# Patient Record
Sex: FEMALE | Race: BLACK OR AFRICAN AMERICAN | NOT HISPANIC OR LATINO | URBAN - METROPOLITAN AREA
[De-identification: names, ages, dates, MRNs, and addresses within clinical notes are randomized per-mention and may not be internally consistent; named-entity substitution may affect disease eponyms.]

---

## 2023-09-07 LAB
ABO GROUP (TYPE) IN BLOOD: NORMAL
ALANINE AMINOTRANSFERASE (SGPT) (U/L) IN SER/PLAS: 7 U/L (ref 7–45)
ALBUMIN (G/DL) IN SER/PLAS: 4.3 G/DL (ref 3.4–5)
ALKALINE PHOSPHATASE (U/L) IN SER/PLAS: 74 U/L (ref 33–110)
ANION GAP IN SER/PLAS: 16 MMOL/L (ref 10–20)
ANTIBODY SCREEN: NORMAL
ASPARTATE AMINOTRANSFERASE (SGOT) (U/L) IN SER/PLAS: 15 U/L (ref 9–39)
BILIRUBIN TOTAL (MG/DL) IN SER/PLAS: 0.3 MG/DL (ref 0–1.2)
CALCIUM (MG/DL) IN SER/PLAS: 9.7 MG/DL (ref 8.6–10.6)
CARBON DIOXIDE, TOTAL (MMOL/L) IN SER/PLAS: 20 MMOL/L (ref 21–32)
CHLAMYDIA TRACH., AMPLIFIED: NEGATIVE
CHLORIDE (MMOL/L) IN SER/PLAS: 102 MMOL/L (ref 98–107)
CREATININE (MG/DL) IN SER/PLAS: 0.51 MG/DL (ref 0.5–1.05)
CREATININE (MG/DL) IN URINE: 183 MG/DL (ref 20–320)
ERYTHROCYTE DISTRIBUTION WIDTH (RATIO) BY AUTOMATED COUNT: 13.4 % (ref 11.5–14.5)
ERYTHROCYTE MEAN CORPUSCULAR HEMOGLOBIN CONCENTRATION (G/DL) BY AUTOMATED: 32.3 G/DL (ref 32–36)
ERYTHROCYTE MEAN CORPUSCULAR VOLUME (FL) BY AUTOMATED COUNT: 91 FL (ref 80–100)
ERYTHROCYTES (10*6/UL) IN BLOOD BY AUTOMATED COUNT: 4.12 X10E12/L (ref 4–5.2)
GFR FEMALE: >90 ML/MIN/1.73M2
GLUCOSE (MG/DL) IN SER/PLAS: 76 MG/DL (ref 74–99)
HEMATOCRIT (%) IN BLOOD BY AUTOMATED COUNT: 37.5 % (ref 36–46)
HEMOGLOBIN (G/DL) IN BLOOD: 12.1 G/DL (ref 12–16)
HEPATITIS B VIRUS SURFACE AG PRESENCE IN SERUM: NONREACTIVE
HEPATITIS C VIRUS AB PRESENCE IN SERUM: NONREACTIVE
HIV 1/ 2 AG/AB SCREEN: NONREACTIVE
LEUKOCYTES (10*3/UL) IN BLOOD BY AUTOMATED COUNT: 9.4 X10E9/L (ref 4.4–11.3)
N. GONORRHEA, AMPLIFIED: NEGATIVE
NRBC (PER 100 WBCS) BY AUTOMATED COUNT: 0 /100 WBC (ref 0–0)
PLATELETS (10*3/UL) IN BLOOD AUTOMATED COUNT: 470 X10E9/L (ref 150–450)
POTASSIUM (MMOL/L) IN SER/PLAS: 4 MMOL/L (ref 3.5–5.3)
PROTEIN (MG/DL) IN URINE: 36 MG/DL (ref 5–24)
PROTEIN TOTAL: 7.5 G/DL (ref 6.4–8.2)
PROTEIN/CREATININE (MG/MG) IN URINE: 0.2 MG/MG CREAT (ref 0–0.17)
REFLEX ADDED, ANEMIA PANEL: ABNORMAL
RH FACTOR: NORMAL
SODIUM (MMOL/L) IN SER/PLAS: 134 MMOL/L (ref 136–145)
UREA NITROGEN (MG/DL) IN SER/PLAS: 8 MG/DL (ref 6–23)

## 2023-09-08 LAB
RUBELLA VIRUS IGG AB: NORMAL
SYPHILIS TOTAL AB: NONREACTIVE
URINE CULTURE: ABNORMAL

## 2023-09-10 LAB
HEMOGLOBIN A2: 3.1 %
HEMOGLOBIN A: 96.5 %
HEMOGLOBIN F: 0.4 %
HEMOGLOBIN IDENTIFICATION INTERPRETATION: NORMAL
PATH REVIEW-HGB IDENTIFICATION: NORMAL

## 2023-09-12 LAB — LAB MOLECULAR CA TECHNICAL NOTES: NORMAL

## 2023-10-03 PROBLEM — O03.9 SPONTANEOUS ABORTION (HHS-HCC): Status: ACTIVE | Noted: 2023-10-03

## 2023-10-03 PROBLEM — O36.5990: Status: ACTIVE | Noted: 2023-10-03

## 2023-10-03 PROBLEM — F32.A DEPRESSION AFFECTING PREGNANCY (HHS-HCC): Status: ACTIVE | Noted: 2023-10-03

## 2023-10-03 PROBLEM — O21.0 HYPEREMESIS OF PREGNANCY (HHS-HCC): Status: ACTIVE | Noted: 2023-10-03

## 2023-10-03 PROBLEM — F99 PSYCHIATRIC DISORDER: Status: ACTIVE | Noted: 2023-10-03

## 2023-10-03 PROBLEM — O23.40 UTI IN PREGNANCY (HHS-HCC): Status: ACTIVE | Noted: 2023-10-03

## 2023-10-03 PROBLEM — E66.9 CLASS 1 OBESITY: Status: ACTIVE | Noted: 2023-10-03

## 2023-10-03 PROBLEM — Z72.89 OTHER PROBLEMS RELATED TO LIFESTYLE: Status: ACTIVE | Noted: 2023-10-03

## 2023-10-03 PROBLEM — D64.9 ANEMIA: Status: ACTIVE | Noted: 2023-10-03

## 2023-10-03 PROBLEM — O09.33 INSUFFICIENT PRENATAL CARE IN THIRD TRIMESTER (HHS-HCC): Status: ACTIVE | Noted: 2023-10-03

## 2023-10-03 PROBLEM — Z87.59 HISTORY OF PRE-ECLAMPSIA: Status: ACTIVE | Noted: 2023-10-03

## 2023-10-03 PROBLEM — O99.340 DEPRESSION AFFECTING PREGNANCY (HHS-HCC): Status: ACTIVE | Noted: 2023-10-03

## 2023-10-03 PROBLEM — R30.0 DYSURIA: Status: ACTIVE | Noted: 2023-10-03

## 2023-10-03 PROBLEM — Z04.9 CONDITION NOT FOUND: Status: ACTIVE | Noted: 2023-10-03

## 2023-10-03 PROBLEM — Z87.51 HISTORY OF PRE-TERM LABOR: Status: ACTIVE | Noted: 2023-10-03

## 2023-10-03 RX ORDER — LABETALOL 200 MG/1
200 TABLET, FILM COATED ORAL 2 TIMES DAILY
COMMUNITY
Start: 2021-07-26 | End: 2024-02-08 | Stop reason: WASHOUT

## 2023-10-03 RX ORDER — METOCLOPRAMIDE 10 MG/1
10 TABLET ORAL EVERY 6 HOURS PRN
COMMUNITY
End: 2024-02-08 | Stop reason: SDUPTHER

## 2023-10-03 RX ORDER — MEDROXYPROGESTERONE ACETATE 150 MG/ML
1 INJECTION, SUSPENSION INTRAMUSCULAR ONCE
COMMUNITY
Start: 2021-07-26 | End: 2024-02-08

## 2023-10-03 RX ORDER — FLUOXETINE 20 MG/1
20 TABLET ORAL DAILY
COMMUNITY
End: 2024-02-08 | Stop reason: WASHOUT

## 2023-10-03 RX ORDER — ASPIRIN 81 MG/1
162 TABLET ORAL DAILY
COMMUNITY
End: 2024-02-08 | Stop reason: SDUPTHER

## 2023-10-03 RX ORDER — ONDANSETRON 4 MG/1
TABLET, FILM COATED ORAL
COMMUNITY
End: 2024-03-02 | Stop reason: HOSPADM

## 2023-10-04 ASSESSMENT — EDINBURGH POSTNATAL DEPRESSION SCALE (EPDS)
THE THOUGHT OF HARMING MYSELF HAS OCCURRED TO ME: NEVER
I HAVE BEEN SO UNHAPPY THAT I HAVE BEEN CRYING: ONLY OCCASIONALLY
I HAVE BEEN ABLE TO LAUGH AND SEE THE FUNNY SIDE OF THINGS: AS MUCH AS I ALWAYS COULD
I HAVE BLAMED MYSELF UNNECESSARILY WHEN THINGS WENT WRONG: YES, SOME OF THE TIME
I HAVE FELT SCARED OR PANICKY FOR NO GOOD REASON: NO, NOT AT ALL
I HAVE BEEN SO UNHAPPY THAT I HAVE HAD DIFFICULTY SLEEPING: NOT AT ALL
I HAVE BEEN ANXIOUS OR WORRIED FOR NO GOOD REASON: YES, SOMETIMES
I HAVE FELT SAD OR MISERABLE: NO, NOT AT ALL
THINGS HAVE BEEN GETTING ON TOP OF ME: NO, MOST OF THE TIME I HAVE COPED QUITE WELL
TOTAL SCORE: 6
I HAVE LOOKED FORWARD WITH ENJOYMENT TO THINGS: AS MUCH AS I EVER DID

## 2023-11-16 ENCOUNTER — ANCILLARY PROCEDURE (OUTPATIENT)
Dept: RADIOLOGY | Facility: CLINIC | Age: 23
End: 2023-11-16
Payer: MEDICAID

## 2023-11-16 DIAGNOSIS — O09.892 HISTORY OF PRETERM DELIVERY, CURRENTLY PREGNANT IN SECOND TRIMESTER (HHS-HCC): ICD-10-CM

## 2023-11-16 PROCEDURE — 76811 OB US DETAILED SNGL FETUS: CPT | Performed by: OBSTETRICS & GYNECOLOGY

## 2023-11-16 PROCEDURE — 76811 OB US DETAILED SNGL FETUS: CPT

## 2023-11-17 ENCOUNTER — TELEPHONE (OUTPATIENT)
Dept: OBSTETRICS AND GYNECOLOGY | Facility: CLINIC | Age: 23
End: 2023-11-17
Payer: MEDICAID

## 2023-11-17 NOTE — TELEPHONE ENCOUNTER
contacted pt  name and  verified  Spoke with pt about scheduling OB appt ASAP. We tried making appt though mychart no success. I wasn't able to navigate BOOK IT for [place] appts. Pt was transferred to scheduling and given phone number to call on Monday if no answer today [phone]. pt verbalized understanding  no further questions or concerns at this time

## 2023-11-17 NOTE — TELEPHONE ENCOUNTER
----- Message from JUDAH Almanzar-HARINI, JUDAH-CNP sent at 11/17/2023 12:28 PM EST -----  Please call patient and schedule for routine prenatal visit with first available provider. It looks like she has not been seen by provider since September.

## 2023-12-13 ENCOUNTER — ANCILLARY PROCEDURE (OUTPATIENT)
Dept: RADIOLOGY | Facility: CLINIC | Age: 23
End: 2023-12-13
Payer: MEDICAID

## 2023-12-13 DIAGNOSIS — O36.5920 MATERNAL CARE FOR OTHER KNOWN OR SUSPECTED POOR FETAL GROWTH, SECOND TRIMESTER, NOT APPLICABLE OR UNSPECIFIED (HHS-HCC): ICD-10-CM

## 2023-12-13 DIAGNOSIS — Z36.3 ENCOUNTER FOR ANTENATAL SCREENING FOR MALFORMATIONS (HHS-HCC): ICD-10-CM

## 2023-12-13 PROCEDURE — 76820 UMBILICAL ARTERY ECHO: CPT

## 2023-12-13 PROCEDURE — 76816 OB US FOLLOW-UP PER FETUS: CPT | Performed by: OBSTETRICS & GYNECOLOGY

## 2023-12-13 PROCEDURE — 76816 OB US FOLLOW-UP PER FETUS: CPT

## 2023-12-13 PROCEDURE — 76820 UMBILICAL ARTERY ECHO: CPT | Performed by: OBSTETRICS & GYNECOLOGY

## 2023-12-13 PROCEDURE — 76819 FETAL BIOPHYS PROFIL W/O NST: CPT | Performed by: OBSTETRICS & GYNECOLOGY

## 2023-12-20 ENCOUNTER — ROUTINE PRENATAL (OUTPATIENT)
Dept: OBSTETRICS AND GYNECOLOGY | Facility: CLINIC | Age: 23
End: 2023-12-20
Payer: MEDICAID

## 2023-12-20 ENCOUNTER — ANCILLARY PROCEDURE (OUTPATIENT)
Dept: RADIOLOGY | Facility: CLINIC | Age: 23
End: 2023-12-20
Payer: MEDICAID

## 2023-12-20 ENCOUNTER — TELEPHONE (OUTPATIENT)
Dept: PEDIATRICS | Facility: CLINIC | Age: 23
End: 2023-12-20

## 2023-12-20 ENCOUNTER — LAB (OUTPATIENT)
Dept: LAB | Facility: LAB | Age: 23
End: 2023-12-20
Payer: MEDICAID

## 2023-12-20 VITALS — SYSTOLIC BLOOD PRESSURE: 113 MMHG | WEIGHT: 135.1 LBS | BODY MASS INDEX: 27.31 KG/M2 | DIASTOLIC BLOOD PRESSURE: 71 MMHG

## 2023-12-20 DIAGNOSIS — O36.5990 PREGNANCY AFFECTED BY FETAL GROWTH RESTRICTION (HHS-HCC): Primary | ICD-10-CM

## 2023-12-20 DIAGNOSIS — O09.92 HIGH RISK PREGNANCY CASE MANAGEMENT PATIENT IN SECOND TRIMESTER (HHS-HCC): ICD-10-CM

## 2023-12-20 DIAGNOSIS — Z36.4 ULTRASOUND FOR ANTENATAL SCREENING FOR FETAL GROWTH RESTRICTION (HHS-HCC): ICD-10-CM

## 2023-12-20 DIAGNOSIS — F90.9 ATTENTION DEFICIT HYPERACTIVITY DISORDER (ADHD), UNSPECIFIED ADHD TYPE: ICD-10-CM

## 2023-12-20 DIAGNOSIS — Z36.3 ENCOUNTER FOR ANTENATAL SCREENING FOR MALFORMATIONS (HHS-HCC): ICD-10-CM

## 2023-12-20 LAB
ERYTHROCYTE [DISTWIDTH] IN BLOOD BY AUTOMATED COUNT: 13.6 % (ref 11.5–14.5)
FERRITIN SERPL-MCNC: 13 NG/ML
FOLATE SERPL-MCNC: 5.2 NG/ML
GLUCOSE 1H P 50 G GLC PO SERPL-MCNC: 102 MG/DL
HCT VFR BLD AUTO: 30 % (ref 36–46)
HGB BLD-MCNC: 9.8 G/DL (ref 12–16)
IRON SATN MFR SERPL: NORMAL %
IRON SERPL-MCNC: 36 UG/DL
MCH RBC QN AUTO: 30.2 PG (ref 26–34)
MCHC RBC AUTO-ENTMCNC: 32.7 G/DL (ref 32–36)
MCV RBC AUTO: 93 FL (ref 80–100)
NRBC BLD-RTO: 0 /100 WBCS (ref 0–0)
PLATELET # BLD AUTO: 356 X10*3/UL (ref 150–450)
RBC # BLD AUTO: 3.24 X10*6/UL (ref 4–5.2)
REFLEX ADDED, ANEMIA PANEL: NORMAL
T PALLIDUM AB SER QL: NONREACTIVE
TIBC SERPL-MCNC: NORMAL UG/DL
UIBC SERPL-MCNC: >450 UG/DL
VIT B12 SERPL-MCNC: 253 PG/ML
WBC # BLD AUTO: 9.2 X10*3/UL (ref 4.4–11.3)

## 2023-12-20 PROCEDURE — 76819 FETAL BIOPHYS PROFIL W/O NST: CPT | Performed by: OBSTETRICS & GYNECOLOGY

## 2023-12-20 PROCEDURE — 86780 TREPONEMA PALLIDUM: CPT

## 2023-12-20 PROCEDURE — 99214 OFFICE O/P EST MOD 30 MIN: CPT | Mod: GC,TH,25 | Performed by: STUDENT IN AN ORGANIZED HEALTH CARE EDUCATION/TRAINING PROGRAM

## 2023-12-20 PROCEDURE — 82746 ASSAY OF FOLIC ACID SERUM: CPT

## 2023-12-20 PROCEDURE — 85027 COMPLETE CBC AUTOMATED: CPT

## 2023-12-20 PROCEDURE — 76820 UMBILICAL ARTERY ECHO: CPT

## 2023-12-20 PROCEDURE — 36415 COLL VENOUS BLD VENIPUNCTURE: CPT

## 2023-12-20 PROCEDURE — 76820 UMBILICAL ARTERY ECHO: CPT | Performed by: OBSTETRICS & GYNECOLOGY

## 2023-12-20 PROCEDURE — 99214 OFFICE O/P EST MOD 30 MIN: CPT | Performed by: STUDENT IN AN ORGANIZED HEALTH CARE EDUCATION/TRAINING PROGRAM

## 2023-12-20 PROCEDURE — 87186 SC STD MICRODIL/AGAR DIL: CPT | Performed by: STUDENT IN AN ORGANIZED HEALTH CARE EDUCATION/TRAINING PROGRAM

## 2023-12-20 PROCEDURE — 90471 IMMUNIZATION ADMIN: CPT | Mod: GC | Performed by: STUDENT IN AN ORGANIZED HEALTH CARE EDUCATION/TRAINING PROGRAM

## 2023-12-20 PROCEDURE — 83550 IRON BINDING TEST: CPT

## 2023-12-20 PROCEDURE — 82728 ASSAY OF FERRITIN: CPT

## 2023-12-20 PROCEDURE — 82947 ASSAY GLUCOSE BLOOD QUANT: CPT

## 2023-12-20 PROCEDURE — 82607 VITAMIN B-12: CPT

## 2023-12-20 PROCEDURE — 76819 FETAL BIOPHYS PROFIL W/O NST: CPT

## 2023-12-20 NOTE — PROGRESS NOTES
Ob Follow-up  23     SUBJECTIVE      HPI: Maryjo Dykes is a 23 y.o.  at 27w3d here for RPNV.  She has no contractions, bleeding, or LOF. Reports normal fetal movement.       OBJECTIVE  Visit Vitals  /71   Wt 61.3 kg (135 lb 1.6 oz)   BMI 27.31 kg/m²   OB Status Pregnant   Smoking Status Never   BSA 1.6 m²            ASSESSMENT & PLAN    Maryjo Dykes is a 23 y.o.  at 27w3d here for the following concerns we addressed today:    Problem List Items Addressed This Visit          Pregnancy    Pregnancy affected by fetal growth restriction    Overview     Diagnosed on 23    Serial Growth US q 3 weeks  23 EFW 10%, AC 2%  23 805g 10%, AC 2%, FGR normal dopplers     BPP and UA Doppler weekly   Timing of delivery:   EFW 3-9%: 38 0/7 - 39 0/7  EFW 1-2%: 37wga           Current Assessment & Plan     Discussed pathogenesis of FGR and delivery timing based on dopplers and overall EFW, patient expressed understanding         High risk pregnancy case management patient in second trimester - Primary    Overview     [x] dating US  [x] PNLs reviewed wnl: rubella equivocal for MMR pp  [] Pap  [x] NT wnl   [] cell-free DNA  [x] Anatomy US  [] 1 hour OGTT: collected   [] 2nd trimester labs: collected   [x] Tdap: done   [x] flu/covid - declines , will think about it  [] GBS  [] Delivery Planning  [x] PPBC - tubal, consent signed            Current Assessment & Plan     Connected with [facility] today         Relevant Orders    Urine culture    Glucose, 1 Hour Screen, Pregnancy    CBC Anemia Panel With Reflex, Pregnancy    Syphilis Screen with Reflex    Follow Up In Obstetrics    ADHD    Overview     PRN aderrall use              Orders Placed This Encounter   Procedures    Urine culture     Order Specific Question:   Release result to [facility]     Answer:   Immediate [1]    Tdap vaccine, age 7 years and older  (BOOSTRIX)    Glucose, 1 Hour Screen, Pregnancy      Standing Status:   Future     Number of Occurrences:   1     Standing Expiration Date:   12/20/2024     Order Specific Question:   Release result to MyChart     Answer:   Immediate [1]    CBC Anemia Panel With Reflex, Pregnancy     Standing Status:   Future     Number of Occurrences:   1     Standing Expiration Date:   12/20/2024     Order Specific Question:   Release result to MyChart     Answer:   Immediate [1]    Syphilis Screen with Reflex     Standing Status:   Future     Number of Occurrences:   1     Standing Expiration Date:   12/20/2024     Order Specific Question:   Release result to MyChart     Answer:   Immediate [1]        RTC in 2 weeks  Seen and discussed with Dr. Pio Ariza MD

## 2023-12-22 DIAGNOSIS — O23.42 URINARY TRACT INFECTION IN MOTHER DURING SECOND TRIMESTER OF PREGNANCY (HHS-HCC): ICD-10-CM

## 2023-12-22 DIAGNOSIS — E61.1 IRON DEFICIENCY: Primary | ICD-10-CM

## 2023-12-22 LAB
BACTERIA UR CULT: ABNORMAL
BACTERIA UR CULT: ABNORMAL

## 2023-12-22 RX ORDER — FERROUS SULFATE 325(65) MG
325 TABLET, DELAYED RELEASE (ENTERIC COATED) ORAL 3 TIMES WEEKLY
Qty: 90 TABLET | Refills: 11 | Status: SHIPPED | OUTPATIENT
Start: 2023-12-22 | End: 2024-02-08 | Stop reason: SDUPTHER

## 2023-12-22 RX ORDER — NITROFURANTOIN 25; 75 MG/1; MG/1
100 CAPSULE ORAL 2 TIMES DAILY
Qty: 14 CAPSULE | Refills: 0 | Status: SHIPPED | OUTPATIENT
Start: 2023-12-22 | End: 2023-12-29

## 2023-12-28 ENCOUNTER — TELEPHONE (OUTPATIENT)
Dept: PEDIATRICS | Facility: CLINIC | Age: 23
End: 2023-12-28
Payer: MEDICAID

## 2023-12-28 NOTE — TELEPHONE ENCOUNTER
SW received referral from [facility] that pt was interested in a call from [facility]. SW left a voicemail message with identifying information for pt and requested a call back. SW second attempt to reach pt. SW will remain available to assist if pt calls back.    Addendum: Pt called SW back. Pt endorsed interest in referral for home-based counseling for anxiety. Denied SI/HI or safety concerns. SW discussed options for counseling referral and obtained verbal consent to refer pt to [facility]. No further SW needs at this time. SW contact info provided if needs arise.

## 2024-01-03 ENCOUNTER — ANCILLARY PROCEDURE (OUTPATIENT)
Dept: RADIOLOGY | Facility: CLINIC | Age: 24
End: 2024-01-03
Payer: MEDICAID

## 2024-01-03 DIAGNOSIS — Z03.74 ENCOUNTER FOR SUSPECTED PROBLEM WITH FETAL GROWTH RULED OUT: ICD-10-CM

## 2024-01-03 PROCEDURE — 76816 OB US FOLLOW-UP PER FETUS: CPT

## 2024-01-03 PROCEDURE — 76816 OB US FOLLOW-UP PER FETUS: CPT | Performed by: OBSTETRICS & GYNECOLOGY

## 2024-01-03 PROCEDURE — 76819 FETAL BIOPHYS PROFIL W/O NST: CPT

## 2024-01-03 PROCEDURE — 76820 UMBILICAL ARTERY ECHO: CPT

## 2024-01-03 PROCEDURE — 76820 UMBILICAL ARTERY ECHO: CPT | Performed by: OBSTETRICS & GYNECOLOGY

## 2024-01-03 PROCEDURE — 76819 FETAL BIOPHYS PROFIL W/O NST: CPT | Performed by: OBSTETRICS & GYNECOLOGY

## 2024-01-10 ENCOUNTER — ANCILLARY PROCEDURE (OUTPATIENT)
Dept: RADIOLOGY | Facility: CLINIC | Age: 24
End: 2024-01-10
Payer: MEDICAID

## 2024-01-10 DIAGNOSIS — Z03.74 ENCOUNTER FOR SUSPECTED PROBLEM WITH FETAL GROWTH RULED OUT: ICD-10-CM

## 2024-01-10 PROCEDURE — 76819 FETAL BIOPHYS PROFIL W/O NST: CPT

## 2024-01-10 PROCEDURE — 76820 UMBILICAL ARTERY ECHO: CPT | Performed by: STUDENT IN AN ORGANIZED HEALTH CARE EDUCATION/TRAINING PROGRAM

## 2024-01-10 PROCEDURE — 76820 UMBILICAL ARTERY ECHO: CPT

## 2024-01-10 PROCEDURE — 76819 FETAL BIOPHYS PROFIL W/O NST: CPT | Performed by: STUDENT IN AN ORGANIZED HEALTH CARE EDUCATION/TRAINING PROGRAM

## 2024-01-17 ENCOUNTER — APPOINTMENT (OUTPATIENT)
Dept: RADIOLOGY | Facility: CLINIC | Age: 24
End: 2024-01-17
Payer: MEDICAID

## 2024-01-20 NOTE — PROGRESS NOTES
I saw and evaluated the patient. I personally obtained the key and critical portions of the history and physical exam or was physically present for key and critical portions performed by the resident/fellow. I reviewed the resident/fellow's documentation and discussed the patient with the resident/fellow. I agree with the resident/fellow's medical decision making as documented in the note.    Molly Suero MD

## 2024-01-24 ENCOUNTER — HOSPITAL ENCOUNTER (OUTPATIENT)
Dept: RADIOLOGY | Facility: CLINIC | Age: 24
Discharge: HOME | End: 2024-01-24
Payer: MEDICAID

## 2024-01-24 DIAGNOSIS — Z03.74 ENCOUNTER FOR SUSPECTED PROBLEM WITH FETAL GROWTH RULED OUT: ICD-10-CM

## 2024-01-24 PROCEDURE — 76816 OB US FOLLOW-UP PER FETUS: CPT | Performed by: OBSTETRICS & GYNECOLOGY

## 2024-01-24 PROCEDURE — 76819 FETAL BIOPHYS PROFIL W/O NST: CPT

## 2024-01-24 PROCEDURE — 76819 FETAL BIOPHYS PROFIL W/O NST: CPT | Performed by: OBSTETRICS & GYNECOLOGY

## 2024-01-24 PROCEDURE — 76816 OB US FOLLOW-UP PER FETUS: CPT

## 2024-01-24 PROCEDURE — 76820 UMBILICAL ARTERY ECHO: CPT | Performed by: OBSTETRICS & GYNECOLOGY

## 2024-01-31 ENCOUNTER — HOSPITAL ENCOUNTER (OUTPATIENT)
Dept: RADIOLOGY | Facility: CLINIC | Age: 24
Discharge: HOME | End: 2024-01-31
Payer: MEDICAID

## 2024-01-31 DIAGNOSIS — O36.5990 FETAL GROWTH RESTRICTION ANTEPARTUM (HHS-HCC): ICD-10-CM

## 2024-01-31 DIAGNOSIS — Z03.74 ENCOUNTER FOR SUSPECTED PROBLEM WITH FETAL GROWTH RULED OUT: ICD-10-CM

## 2024-01-31 PROCEDURE — 76820 UMBILICAL ARTERY ECHO: CPT | Performed by: STUDENT IN AN ORGANIZED HEALTH CARE EDUCATION/TRAINING PROGRAM

## 2024-01-31 PROCEDURE — 76820 UMBILICAL ARTERY ECHO: CPT

## 2024-01-31 PROCEDURE — 76819 FETAL BIOPHYS PROFIL W/O NST: CPT

## 2024-01-31 PROCEDURE — 76819 FETAL BIOPHYS PROFIL W/O NST: CPT | Performed by: STUDENT IN AN ORGANIZED HEALTH CARE EDUCATION/TRAINING PROGRAM

## 2024-02-07 ENCOUNTER — HOSPITAL ENCOUNTER (OUTPATIENT)
Dept: RADIOLOGY | Facility: CLINIC | Age: 24
Discharge: HOME | End: 2024-02-07
Payer: MEDICAID

## 2024-02-07 DIAGNOSIS — Z03.74 ENCOUNTER FOR SUSPECTED PROBLEM WITH FETAL GROWTH RULED OUT: ICD-10-CM

## 2024-02-07 DIAGNOSIS — O36.5990 IUGR (INTRAUTERINE GROWTH RESTRICTION) AFFECTING CARE OF MOTHER (HHS-HCC): ICD-10-CM

## 2024-02-07 PROCEDURE — 76819 FETAL BIOPHYS PROFIL W/O NST: CPT | Performed by: OBSTETRICS & GYNECOLOGY

## 2024-02-07 PROCEDURE — 76820 UMBILICAL ARTERY ECHO: CPT

## 2024-02-07 PROCEDURE — 76820 UMBILICAL ARTERY ECHO: CPT | Performed by: OBSTETRICS & GYNECOLOGY

## 2024-02-07 PROCEDURE — 76819 FETAL BIOPHYS PROFIL W/O NST: CPT

## 2024-02-08 ENCOUNTER — ROUTINE PRENATAL (OUTPATIENT)
Dept: OBSTETRICS AND GYNECOLOGY | Facility: CLINIC | Age: 24
End: 2024-02-08
Payer: MEDICAID

## 2024-02-08 ENCOUNTER — LAB (OUTPATIENT)
Dept: LAB | Facility: LAB | Age: 24
End: 2024-02-08
Payer: MEDICAID

## 2024-02-08 VITALS — DIASTOLIC BLOOD PRESSURE: 76 MMHG | SYSTOLIC BLOOD PRESSURE: 115 MMHG | BODY MASS INDEX: 27.81 KG/M2 | WEIGHT: 137.6 LBS

## 2024-02-08 DIAGNOSIS — O99.013 ANEMIA AFFECTING PREGNANCY IN THIRD TRIMESTER (HHS-HCC): ICD-10-CM

## 2024-02-08 DIAGNOSIS — O21.9 NAUSEA AND VOMITING DURING PREGNANCY (HHS-HCC): ICD-10-CM

## 2024-02-08 DIAGNOSIS — O36.5990 PREGNANCY AFFECTED BY FETAL GROWTH RESTRICTION (HHS-HCC): ICD-10-CM

## 2024-02-08 DIAGNOSIS — F90.9 ATTENTION DEFICIT HYPERACTIVITY DISORDER (ADHD), UNSPECIFIED ADHD TYPE: ICD-10-CM

## 2024-02-08 DIAGNOSIS — O09.93 HIGH-RISK PREGNANCY IN THIRD TRIMESTER (HHS-HCC): Primary | ICD-10-CM

## 2024-02-08 DIAGNOSIS — E61.1 IRON DEFICIENCY: ICD-10-CM

## 2024-02-08 LAB
FERRITIN SERPL-MCNC: 10 NG/ML (ref 8–150)
HGB RETIC QN: 28 PG (ref 28–38)
IMMATURE RETIC FRACTION: 24.1 %
POC APPEARANCE, URINE: CLEAR
POC BILIRUBIN, URINE: NEGATIVE
POC BLOOD, URINE: NEGATIVE
POC COLOR, URINE: YELLOW
POC GLUCOSE, URINE: NEGATIVE MG/DL
POC KETONES, URINE: NEGATIVE MG/DL
POC LEUKOCYTES, URINE: ABNORMAL
POC NITRITE,URINE: POSITIVE
POC PH, URINE: 7.5 PH
POC PROTEIN, URINE: ABNORMAL MG/DL
POC SPECIFIC GRAVITY, URINE: 1.01
POC UROBILINOGEN, URINE: 1 EU/DL
RETICS #: 0.06 X10*6/UL (ref 0.02–0.08)
RETICS/RBC NFR AUTO: 2 % (ref 0.5–2)

## 2024-02-08 PROCEDURE — 99214 OFFICE O/P EST MOD 30 MIN: CPT | Performed by: ADVANCED PRACTICE MIDWIFE

## 2024-02-08 PROCEDURE — 87086 URINE CULTURE/COLONY COUNT: CPT | Performed by: ADVANCED PRACTICE MIDWIFE

## 2024-02-08 PROCEDURE — 82728 ASSAY OF FERRITIN: CPT

## 2024-02-08 PROCEDURE — 99214 OFFICE O/P EST MOD 30 MIN: CPT | Mod: TH | Performed by: ADVANCED PRACTICE MIDWIFE

## 2024-02-08 PROCEDURE — 85045 AUTOMATED RETICULOCYTE COUNT: CPT

## 2024-02-08 PROCEDURE — 36415 COLL VENOUS BLD VENIPUNCTURE: CPT

## 2024-02-08 PROCEDURE — 81003 URINALYSIS AUTO W/O SCOPE: CPT | Mod: QW,91 | Performed by: ADVANCED PRACTICE MIDWIFE

## 2024-02-08 RX ORDER — METOCLOPRAMIDE 10 MG/1
10 TABLET ORAL EVERY 6 HOURS PRN
Qty: 60 TABLET | Refills: 2 | Status: SHIPPED | OUTPATIENT
Start: 2024-02-08 | End: 2024-03-02 | Stop reason: HOSPADM

## 2024-02-08 RX ORDER — IRON,CARBONYL 15 MG
1 TABLET,CHEWABLE ORAL DAILY
Qty: 30 EACH | Refills: 2 | Status: SHIPPED | OUTPATIENT
Start: 2024-02-08 | End: 2024-02-15 | Stop reason: SDUPTHER

## 2024-02-08 RX ORDER — ONDANSETRON 4 MG/1
4 TABLET, ORALLY DISINTEGRATING ORAL EVERY 8 HOURS PRN
Qty: 60 TABLET | Refills: 1 | Status: SHIPPED | OUTPATIENT
Start: 2024-02-08 | End: 2024-03-02 | Stop reason: HOSPADM

## 2024-02-08 RX ORDER — ASPIRIN 81 MG/1
162 TABLET ORAL DAILY
Qty: 60 TABLET | Refills: 1 | Status: SHIPPED | OUTPATIENT
Start: 2024-02-08 | End: 2024-03-02 | Stop reason: HOSPADM

## 2024-02-08 ASSESSMENT — ENCOUNTER SYMPTOMS
ENDOCRINE NEGATIVE: 0
RESPIRATORY NEGATIVE: 0
HEMATOLOGIC/LYMPHATIC NEGATIVE: 0
CONSTITUTIONAL NEGATIVE: 0
ALLERGIC/IMMUNOLOGIC NEGATIVE: 0
GASTROINTESTINAL NEGATIVE: 0
NEUROLOGICAL NEGATIVE: 0
EYES NEGATIVE: 0
PSYCHIATRIC NEGATIVE: 0
CARDIOVASCULAR NEGATIVE: 0
MUSCULOSKELETAL NEGATIVE: 0

## 2024-02-08 NOTE — PROGRESS NOTES
Subjective     Maryjo Dykes is a 23 y.o.  at 34w4d with a working estimated date of delivery of 3/17/2024, by Ultrasound who presents for a routine prenatal visit.     She denies vaginal bleeding, leakage of fluid, decreased fetal movements, or contractions.    Patient has not been seen since  - has been getting weekly BPPs for FGR. Patient c/o nausea and vomiting   Vomiting 4 times per day, sometimes keeps apple juice down. Unable to keep water/food down. Not currently taking any medications. Stays in bed and sleeps. Reports zofran and reglan did help but she ran out of medication   Completed antibiotics for UTI - will send MURRAY today  Signed tubal consent at last visit - reports she has copy of paper at home     Objective   Physical Exam:   Weight: 62.4 kg (137 lb 9.6 oz)  Expected Total Weight Gain: Could not be calculated   Pregravid BMI: Could not be calculated  BP: 115/76 (Pluse-87)  Fetal Heart Rate: 140 Fundal Height (cm): 34 cm Presentation: Vertex           Postpartum Depression: Medium Risk (10/4/2023)    [place]  Depression Scale     Last EPDS Total Score: 6     Last EPDS Self Harm Result: Never        Problem List Items Addressed This Visit       Pregnancy affected by fetal growth restriction    Overview     Diagnosed on 23    Serial Growth US q 3 weeks  23 EFW 10%, AC 2%  23 805g 10%, AC 2%, FGR normal dopplers     BPP and UA Doppler weekly   Timing of delivery:   EFW 3-9%: 38 0/7 - 39 0/7  EFW 1-2%: 37wga           Relevant Medications    aspirin 81 mg EC tablet    Other Relevant Orders    Urine Culture    High risk pregnancy case management patient in second trimester - Primary    Overview     [x] dating US  [x] PNLs reviewed wnl: rubella equivocal for MMR pp  [] Pap  [x] NT wnl   [] cell-free DNA  [x] Anatomy US  [x] 1 hour OGTT: collected   [x] 2nd trimester labs: collected   [x] Tdap: done   [x] flu/covid - declines , will think about  it  [] GBS  [] Delivery Planning  [x] PPBC - tubal, consent signed            ADHD    Overview     PRN aderrall use         Anemia affecting pregnancy in third trimester    Relevant Orders    CBC Anemia Panel With Reflex,Pregnancy    Ferritin    Reticulocytes    Nausea and vomiting during pregnancy    Relevant Medications    ondansetron ODT (Zofran-ODT) 4 mg disintegrating tablet    metoclopramide (Reglan) 10 mg tablet     Other Visit Diagnoses       Iron deficiency        Relevant Medications    iron, carbonyl (Iron Chews) 15 mg tablet,chewable            Postpartum contraception options discussed, patient desires tubal - consent signed at last visit, patient has copy   surveillance weekly for FGR  UTI - completed antibiotics - will send MURRAY today  Anemia - took iron pills but states she needs a refill - rx sent, recheck CBC/ferritin retic today   Refill on zofran and reglan. UA - negative ketones, SG 1.015, discussed when to present to triage if unable to keep down fluids. Discussed importance of hydration and small frequent snacks. Referral to [facility] for Boost - order form filled out and given to patient to stop down at [facility] office  Discussed importance of regular prenatal visits in addition to ultrasound   Reviewed s/sx of PTL, warning signs, fetal movement counts, and when to call provider  Follow up in 1 week for a routine prenatal visit.  next visit: Tdap, GBS     Khushbu Cooley, JUDAH-HARINI, JUDAH-CNP

## 2024-02-09 LAB — BACTERIA UR CULT: ABNORMAL

## 2024-02-11 ENCOUNTER — HOSPITAL ENCOUNTER (OUTPATIENT)
Facility: HOSPITAL | Age: 24
Discharge: HOME | End: 2024-02-12
Attending: OBSTETRICS & GYNECOLOGY | Admitting: OBSTETRICS & GYNECOLOGY
Payer: MEDICAID

## 2024-02-11 PROCEDURE — 99214 OFFICE O/P EST MOD 30 MIN: CPT

## 2024-02-11 PROCEDURE — 2500000001 HC RX 250 WO HCPCS SELF ADMINISTERED DRUGS (ALT 637 FOR MEDICARE OP)

## 2024-02-11 PROCEDURE — 59025 FETAL NON-STRESS TEST: CPT | Mod: GC

## 2024-02-11 PROCEDURE — 59025 FETAL NON-STRESS TEST: CPT

## 2024-02-11 PROCEDURE — 2500000004 HC RX 250 GENERAL PHARMACY W/ HCPCS (ALT 636 FOR OP/ED)

## 2024-02-11 RX ORDER — ONDANSETRON HYDROCHLORIDE 2 MG/ML
4 INJECTION, SOLUTION INTRAVENOUS EVERY 6 HOURS PRN
Status: DISCONTINUED | OUTPATIENT
Start: 2024-02-11 | End: 2024-02-12 | Stop reason: HOSPADM

## 2024-02-11 RX ORDER — ONDANSETRON 4 MG/1
4 TABLET, FILM COATED ORAL EVERY 6 HOURS PRN
Status: DISCONTINUED | OUTPATIENT
Start: 2024-02-11 | End: 2024-02-12 | Stop reason: HOSPADM

## 2024-02-11 RX ORDER — ACETAMINOPHEN 325 MG/1
975 TABLET ORAL ONCE
Status: DISCONTINUED | OUTPATIENT
Start: 2024-02-11 | End: 2024-02-12 | Stop reason: HOSPADM

## 2024-02-11 RX ORDER — CYCLOBENZAPRINE HCL 10 MG
10 TABLET ORAL ONCE
Status: COMPLETED | OUTPATIENT
Start: 2024-02-11 | End: 2024-02-11

## 2024-02-11 RX ADMIN — CYCLOBENZAPRINE 10 MG: 10 TABLET, FILM COATED ORAL at 21:36

## 2024-02-11 RX ADMIN — ONDANSETRON 4 MG: 2 INJECTION INTRAMUSCULAR; INTRAVENOUS at 22:15

## 2024-02-11 SDOH — SOCIAL STABILITY: SOCIAL INSECURITY: ARE YOU OR HAVE YOU BEEN THREATENED OR ABUSED PHYSICALLY, EMOTIONALLY, OR SEXUALLY BY ANYONE?: NO

## 2024-02-11 SDOH — SOCIAL STABILITY: SOCIAL INSECURITY: DOES ANYONE TRY TO KEEP YOU FROM HAVING/CONTACTING OTHER FRIENDS OR DOING THINGS OUTSIDE YOUR HOME?: NO

## 2024-02-11 SDOH — SOCIAL STABILITY: SOCIAL INSECURITY: HAVE YOU HAD THOUGHTS OF HARMING ANYONE ELSE?: NO

## 2024-02-11 SDOH — ECONOMIC STABILITY: HOUSING INSECURITY: DO YOU FEEL UNSAFE GOING BACK TO THE PLACE WHERE YOU ARE LIVING?: NO

## 2024-02-11 SDOH — HEALTH STABILITY: MENTAL HEALTH: SUICIDAL BEHAVIOR (LIFETIME): NO

## 2024-02-11 SDOH — HEALTH STABILITY: MENTAL HEALTH: NON-SPECIFIC ACTIVE SUICIDAL THOUGHTS (PAST 1 MONTH): NO

## 2024-02-11 SDOH — SOCIAL STABILITY: SOCIAL INSECURITY: HAS ANYONE EVER THREATENED TO HURT YOUR FAMILY OR YOUR PETS?: NO

## 2024-02-11 SDOH — HEALTH STABILITY: MENTAL HEALTH: WISH TO BE DEAD (PAST 1 MONTH): NO

## 2024-02-11 SDOH — HEALTH STABILITY: MENTAL HEALTH: HAVE YOU USED ANY SUBSTANCES (CANABIS, COCAINE, HEROIN, HALLUCINOGENS, INHALANTS, ETC.) IN THE PAST 12 MONTHS?: NO

## 2024-02-11 SDOH — SOCIAL STABILITY: SOCIAL INSECURITY: ABUSE SCREEN: ADULT

## 2024-02-11 SDOH — SOCIAL STABILITY: SOCIAL INSECURITY: VERBAL ABUSE: DENIES

## 2024-02-11 SDOH — SOCIAL STABILITY: SOCIAL INSECURITY: ARE THERE ANY APPARENT SIGNS OF INJURIES/BEHAVIORS THAT COULD BE RELATED TO ABUSE/NEGLECT?: NO

## 2024-02-11 SDOH — SOCIAL STABILITY: SOCIAL INSECURITY: DO YOU FEEL ANYONE HAS EXPLOITED OR TAKEN ADVANTAGE OF YOU FINANCIALLY OR OF YOUR PERSONAL PROPERTY?: NO

## 2024-02-11 SDOH — SOCIAL STABILITY: SOCIAL INSECURITY: PHYSICAL ABUSE: DENIES

## 2024-02-11 SDOH — HEALTH STABILITY: MENTAL HEALTH: WERE YOU ABLE TO COMPLETE ALL THE BEHAVIORAL HEALTH SCREENINGS?: YES

## 2024-02-11 SDOH — HEALTH STABILITY: MENTAL HEALTH: HAVE YOU USED ANY PRESCRIPTION DRUGS OTHER THAN PRESCRIBED IN THE PAST 12 MONTHS?: NO

## 2024-02-11 ASSESSMENT — LIFESTYLE VARIABLES
HOW MANY STANDARD DRINKS CONTAINING ALCOHOL DO YOU HAVE ON A TYPICAL DAY: PATIENT DOES NOT DRINK
AUDIT-C TOTAL SCORE: 0
SKIP TO QUESTIONS 9-10: 1
HOW OFTEN DO YOU HAVE 6 OR MORE DRINKS ON ONE OCCASION: NEVER
HOW OFTEN DO YOU HAVE A DRINK CONTAINING ALCOHOL: NEVER
AUDIT-C TOTAL SCORE: 0

## 2024-02-11 ASSESSMENT — PATIENT HEALTH QUESTIONNAIRE - PHQ9
SUM OF ALL RESPONSES TO PHQ9 QUESTIONS 1 & 2: 0
2. FEELING DOWN, DEPRESSED OR HOPELESS: NOT AT ALL
1. LITTLE INTEREST OR PLEASURE IN DOING THINGS: NOT AT ALL

## 2024-02-12 ENCOUNTER — TELEPHONE (OUTPATIENT)
Dept: OBSTETRICS AND GYNECOLOGY | Facility: HOSPITAL | Age: 24
End: 2024-02-12
Payer: MEDICAID

## 2024-02-12 VITALS — DIASTOLIC BLOOD PRESSURE: 66 MMHG | HEART RATE: 69 BPM | OXYGEN SATURATION: 98 % | SYSTOLIC BLOOD PRESSURE: 115 MMHG

## 2024-02-12 DIAGNOSIS — O23.43 URINARY TRACT INFECTION IN MOTHER DURING THIRD TRIMESTER OF PREGNANCY (HHS-HCC): Primary | ICD-10-CM

## 2024-02-12 DIAGNOSIS — E61.1 IRON DEFICIENCY: ICD-10-CM

## 2024-02-12 DIAGNOSIS — B96.89 BACTERIAL VAGINOSIS IN PREGNANCY (HHS-HCC): ICD-10-CM

## 2024-02-12 DIAGNOSIS — O23.599 BACTERIAL VAGINOSIS IN PREGNANCY (HHS-HCC): ICD-10-CM

## 2024-02-12 DIAGNOSIS — Z3A.35 35 WEEKS GESTATION OF PREGNANCY (HHS-HCC): Primary | ICD-10-CM

## 2024-02-12 LAB
CLUE CELLS SPEC QL WET PREP: PRESENT
T VAGINALIS SPEC QL WET PREP: ABNORMAL
WBC VAG QL WET PREP: ABNORMAL
YEAST VAG QL WET PREP: ABNORMAL

## 2024-02-12 PROCEDURE — 99215 OFFICE O/P EST HI 40 MIN: CPT | Mod: 25

## 2024-02-12 PROCEDURE — 87210 SMEAR WET MOUNT SALINE/INK: CPT

## 2024-02-12 RX ORDER — METRONIDAZOLE 500 MG/1
500 TABLET ORAL 2 TIMES DAILY
Qty: 14 TABLET | Refills: 0 | Status: SHIPPED | OUTPATIENT
Start: 2024-02-12 | End: 2024-02-14 | Stop reason: SDUPTHER

## 2024-02-12 RX ORDER — AMOXICILLIN 500 MG/1
500 CAPSULE ORAL EVERY 8 HOURS SCHEDULED
Qty: 15 CAPSULE | Refills: 0 | Status: SHIPPED | OUTPATIENT
Start: 2024-02-12 | End: 2024-02-14 | Stop reason: SDUPTHER

## 2024-02-12 NOTE — ED TRIAGE NOTES
PT to the ED for contractions 1 min apart, . Resident checked pt, 1cm dialated. Water broke unknown time.

## 2024-02-12 NOTE — H&P
Obstetrical Triage History and Physical     Maryjo Dykes is a 23 y.o.  at 35w0d     Chief Complaint: Pregnancy Problem and Contractions    Assessment/Plan    R/o PPROM/PTL  - Neg x 3 for ROM  - Cervix: 3  - Tylenol/flexeril for pain  - Plan for 2hr recheck    IUP at 35w0d   - NST reactive  - Good fetal movement  - GBS +, for PCN if in PTL    Maternal Well-being  - Vital signs stable and WNL  - All questions and concerns addressed     Plan signed out to Dr. Botello.  For recheck in 2hrs.  Discussed repeating SSE if noting continued leakage.    JUDAH Spencer-CNP     Updated A/P  - 2 hrs later: re-check 1/50/-3 (essentially unchanged given new examiner). Pt reports ctx have spaced and she has been able to sleep comfortably.  - Re-spec was neg x2 for pooling, ferning, and nitrazine. Several white cystic lesions seen on cervix, likely nabothian cysts. Pt declined HSV swab despite counseling on risks of HSV in pregnancy. Wet prep collected, and GC/CT ordered off urine. Will contact pt if abnormal.  - JARED 15, cephalic presentation  - reactive NST while in triage  - FGR: receiving weekly dopplers, last  wnl. Has next US  and next appt  to follow up outpatient.  - Given neg speculum exam x2 and normal JARED, PPROM unlikely. Given unchanged cervix and spacing ctx, PTL unlikely. Given return precautions. Stable for discharge home.    Pt seen and d/w Dr. Mary Alice Botello MD PGY-2        Active Problems:  There are no active Hospital Problems.      Pregnancy Problems (from 23 to present)       Problem Noted Resolved    Anemia affecting pregnancy in third trimester 2024 by ERICK Campa, APRN-CNP No    Priority:  Medium      Nausea and vomiting during pregnancy 2024 by ERICK Campa, APRN-CNP No    Priority:  Medium      High risk pregnancy case management patient in second trimester 2023 by lEijah Ariza MD No    Priority:  Medium      Overview Addendum  2024 11:48 AM by Khushbu Cooley, APRN-CNM, APRN-CNP     [x] dating US  [x] PNLs reviewed wnl: rubella equivocal for MMR pp  [] Pap  [x] NT wnl   [] cell-free DNA  [x] Anatomy US  [x] 1 hour OGTT: collected   [x] 2nd trimester labs: collected   [x] Tdap: done   [x] flu/covid - declines , will think about it  [] GBS  [] Delivery Planning  [x] PPBC - tubal, consent signed            ADHD 2023 by Elijah Ariza MD No    Priority:  Medium      Overview Addendum 2023 12:12 PM by Elijah Ariza MD     PRN aderrall use         Pregnancy affected by fetal growth restriction 10/3/2023 by Ani Sparks No    Priority:  Medium      Overview Addendum 2023 11:35 AM by Elijah Ariza MD     Diagnosed on 23    Serial Growth US q 3 weeks  23 EFW 10%, AC 2%  23 805g 10%, AC 2%, FGR normal dopplers     BPP and UA Doppler weekly   Timing of delivery:   EFW 3-9%: 38 0/7 - 39 0/7  EFW 1-2%: 37wga                 Subjective   Amyia is here for contractions and leakage of fluid.  Noted leakage at 8:30.  Denies continued leaking.  Reports contractions started after water breaking.  Irregular, noting them every few minutes.  Denies VB and endorses good fetal movement.     Obstetrical History   OB History    Para Term  AB Living   6 4 1 3 1 4   SAB IAB Ectopic Multiple Live Births   1 0 0 0 4      # Outcome Date GA Lbr Campbell/2nd Weight Sex Delivery Anes PTL Lv   6 Current            5  10/22/22 36w0d  2.126 kg F Vag-Spont  Y SUSANNAH   4  21 36w6d    Vag-Spont  Y SUSANNAH   3  20 34w0d  2.07 kg F Vag-Spont None Y SUSANNAH   2 Term 18 37w6d  1.984 kg M Vag-Spont EPI N SUSANNAH   1 SAB 2016              Obstetric Comments   2023 4:58 PM - MR  Pt no-showed appointment today for MFM consult with Dr. Bar March        Past Medical History  Past Medical History:   Diagnosis Date    Depression     History of pre-eclampsia     History of   delivery, currently pregnant     Hyperemesis affecting pregnancy, antepartum     Pregnancy     Psychiatric problem     ADHD, BIPOLAR, SCHIZOPHRENIA    UTI in pregnancy, antepartum         Past Surgical History   No past surgical history on file.    Social History  Social History     Tobacco Use    Smoking status: Never    Smokeless tobacco: Never   Substance Use Topics    Alcohol use: Never     Substance and Sexual Activity   Drug Use Never       Allergies  Milk     Medications  Medications Prior to Admission   Medication Sig Dispense Refill Last Dose    aspirin 81 mg EC tablet Take 2 tablets (162 mg) by mouth once daily. 60 tablet 1     iron, carbonyl (Iron Chews) 15 mg tablet,chewable Chew 1 tablet once daily. 30 each 2     metoclopramide (Reglan) 10 mg tablet Take 1 tablet (10 mg) by mouth every 6 hours if needed (nausea/vomiting). 60 tablet 2     ondansetron (Zofran) 4 mg tablet Take by mouth.       ondansetron ODT (Zofran-ODT) 4 mg disintegrating tablet Take 1 tablet (4 mg) by mouth every 8 hours if needed for nausea or vomiting. 60 tablet 1     PNV no.95/ferrous fum/folic ac (PRENATAL ORAL) Take by mouth.          Objective    Last Vitals  Temp Pulse Resp BP MAP O2 Sat     67   127/75   97 %     Physical Examination  Physical Exam  Exam conducted with a chaperone present.   Constitutional:       Appearance: Normal appearance.   HENT:      Head: Normocephalic.   Cardiovascular:      Rate and Rhythm: Normal rate.   Pulmonary:      Effort: Pulmonary effort is normal.   Abdominal:      Comments: Gravid   Genitourinary:     Comments: SSE:  - Cervix visualized  - Negative for pooling, nitrazine, and ferning  - Normal vaginal discharge present    Cervix: 1/30/-3  Musculoskeletal:         General: Normal range of motion.   Skin:     General: Skin is warm.   Neurological:      Mental Status: She is alert and oriented to person, place, and time.   Psychiatric:         Mood and Affect: Mood normal.         Behavior:  Behavior normal.        Lab Review  Labs in chart were reviewed.

## 2024-02-12 NOTE — TELEPHONE ENCOUNTER
Attempted to call patient, but phone number not in service. Alternate contact's number was not her's. Called to discuss abnormal wet prep. Note in pregnancy sticky note made for next PNV to obtain updated contact information and discuss abnormal wet prep result.

## 2024-02-14 ENCOUNTER — HOSPITAL ENCOUNTER (OUTPATIENT)
Dept: RADIOLOGY | Facility: CLINIC | Age: 24
Discharge: HOME | End: 2024-02-14
Payer: MEDICAID

## 2024-02-14 DIAGNOSIS — O23.43 URINARY TRACT INFECTION IN MOTHER DURING THIRD TRIMESTER OF PREGNANCY (HHS-HCC): Primary | ICD-10-CM

## 2024-02-14 DIAGNOSIS — Z3A.35 35 WEEKS GESTATION OF PREGNANCY (HHS-HCC): ICD-10-CM

## 2024-02-14 DIAGNOSIS — O23.599 BACTERIAL VAGINOSIS IN PREGNANCY (HHS-HCC): ICD-10-CM

## 2024-02-14 DIAGNOSIS — Z03.74 ENCOUNTER FOR SUSPECTED PROBLEM WITH FETAL GROWTH RULED OUT: ICD-10-CM

## 2024-02-14 DIAGNOSIS — B96.89 BACTERIAL VAGINOSIS IN PREGNANCY (HHS-HCC): ICD-10-CM

## 2024-02-14 PROCEDURE — 76816 OB US FOLLOW-UP PER FETUS: CPT

## 2024-02-14 PROCEDURE — 76816 OB US FOLLOW-UP PER FETUS: CPT | Performed by: OBSTETRICS & GYNECOLOGY

## 2024-02-14 PROCEDURE — 76819 FETAL BIOPHYS PROFIL W/O NST: CPT | Performed by: OBSTETRICS & GYNECOLOGY

## 2024-02-14 PROCEDURE — 76819 FETAL BIOPHYS PROFIL W/O NST: CPT

## 2024-02-14 PROCEDURE — 76820 UMBILICAL ARTERY ECHO: CPT | Performed by: OBSTETRICS & GYNECOLOGY

## 2024-02-14 PROCEDURE — 76820 UMBILICAL ARTERY ECHO: CPT

## 2024-02-14 RX ORDER — METRONIDAZOLE 500 MG/1
500 TABLET ORAL 2 TIMES DAILY
Qty: 14 TABLET | Refills: 0 | Status: SHIPPED | OUTPATIENT
Start: 2024-02-14 | End: 2024-02-29 | Stop reason: ALTCHOICE

## 2024-02-15 RX ORDER — AMOXICILLIN 500 MG/1
500 CAPSULE ORAL EVERY 8 HOURS SCHEDULED
Qty: 15 CAPSULE | Refills: 0 | Status: SHIPPED | OUTPATIENT
Start: 2024-02-15 | End: 2024-02-29 | Stop reason: ALTCHOICE

## 2024-02-15 RX ORDER — IRON,CARBONYL 15 MG
1 TABLET,CHEWABLE ORAL 2 TIMES DAILY
Qty: 60 EACH | Refills: 2 | Status: ON HOLD | OUTPATIENT
Start: 2024-02-15 | End: 2024-03-02 | Stop reason: SDUPTHER

## 2024-02-21 ENCOUNTER — HOSPITAL ENCOUNTER (OUTPATIENT)
Dept: RADIOLOGY | Facility: CLINIC | Age: 24
Discharge: HOME | End: 2024-02-21
Payer: MEDICAID

## 2024-02-21 DIAGNOSIS — Z03.74 ENCOUNTER FOR SUSPECTED PROBLEM WITH FETAL GROWTH RULED OUT: ICD-10-CM

## 2024-02-21 PROCEDURE — 76819 FETAL BIOPHYS PROFIL W/O NST: CPT | Performed by: OBSTETRICS & GYNECOLOGY

## 2024-02-21 PROCEDURE — 76820 UMBILICAL ARTERY ECHO: CPT

## 2024-02-21 PROCEDURE — 76819 FETAL BIOPHYS PROFIL W/O NST: CPT

## 2024-02-21 PROCEDURE — 76820 UMBILICAL ARTERY ECHO: CPT | Performed by: OBSTETRICS & GYNECOLOGY

## 2024-02-22 ENCOUNTER — ROUTINE PRENATAL (OUTPATIENT)
Dept: OBSTETRICS AND GYNECOLOGY | Facility: CLINIC | Age: 24
End: 2024-02-22
Payer: MEDICAID

## 2024-02-22 ENCOUNTER — DOCUMENTATION (OUTPATIENT)
Dept: OBSTETRICS AND GYNECOLOGY | Facility: CLINIC | Age: 24
End: 2024-02-22

## 2024-02-22 ENCOUNTER — HOSPITAL ENCOUNTER (OUTPATIENT)
Facility: HOSPITAL | Age: 24
Discharge: HOME | End: 2024-02-22
Attending: STUDENT IN AN ORGANIZED HEALTH CARE EDUCATION/TRAINING PROGRAM | Admitting: STUDENT IN AN ORGANIZED HEALTH CARE EDUCATION/TRAINING PROGRAM
Payer: MEDICAID

## 2024-02-22 VITALS — WEIGHT: 134.5 LBS | SYSTOLIC BLOOD PRESSURE: 117 MMHG | DIASTOLIC BLOOD PRESSURE: 80 MMHG | BODY MASS INDEX: 27.19 KG/M2

## 2024-02-22 DIAGNOSIS — B95.1 GBS (GROUP B STREPTOCOCCUS) UTI COMPLICATING PREGNANCY, THIRD TRIMESTER (HHS-HCC): ICD-10-CM

## 2024-02-22 DIAGNOSIS — O99.013 ANEMIA AFFECTING PREGNANCY IN THIRD TRIMESTER (HHS-HCC): ICD-10-CM

## 2024-02-22 DIAGNOSIS — O21.9 NAUSEA AND VOMITING DURING PREGNANCY (HHS-HCC): ICD-10-CM

## 2024-02-22 DIAGNOSIS — O23.43 URINARY TRACT INFECTION IN MOTHER DURING THIRD TRIMESTER OF PREGNANCY (HHS-HCC): ICD-10-CM

## 2024-02-22 DIAGNOSIS — O09.93 HIGH-RISK PREGNANCY IN THIRD TRIMESTER (HHS-HCC): ICD-10-CM

## 2024-02-22 DIAGNOSIS — O23.43 GBS (GROUP B STREPTOCOCCUS) UTI COMPLICATING PREGNANCY, THIRD TRIMESTER (HHS-HCC): ICD-10-CM

## 2024-02-22 DIAGNOSIS — Z3A.36 36 WEEKS GESTATION OF PREGNANCY (HHS-HCC): Primary | ICD-10-CM

## 2024-02-22 DIAGNOSIS — O36.5931 POOR FETAL GROWTH AFFECTING MANAGEMENT OF MOTHER IN THIRD TRIMESTER, FETUS 1 OF MULTIPLE GESTATION (HHS-HCC): ICD-10-CM

## 2024-02-22 DIAGNOSIS — Z36.4 ULTRASOUND FOR ANTENATAL SCREENING FOR FETAL GROWTH RESTRICTION (HHS-HCC): ICD-10-CM

## 2024-02-22 PROBLEM — O36.5930 POOR FETAL GROWTH AFFECTING MANAGEMENT OF MOTHER IN THIRD TRIMESTER (HHS-HCC): Status: ACTIVE | Noted: 2024-02-22

## 2024-02-22 LAB
POC APPEARANCE, URINE: CLEAR
POC BILIRUBIN, URINE: ABNORMAL
POC BLOOD, URINE: NEGATIVE
POC COLOR, URINE: YELLOW
POC GLUCOSE, URINE: NEGATIVE MG/DL
POC KETONES, URINE: NEGATIVE MG/DL
POC LEUKOCYTES, URINE: NEGATIVE
POC NITRITE,URINE: NEGATIVE
POC PH, URINE: 7 PH
POC PROTEIN, URINE: ABNORMAL MG/DL
POC SPECIFIC GRAVITY, URINE: 1.02
POC UROBILINOGEN, URINE: 1 EU/DL

## 2024-02-22 PROCEDURE — 81003 URINALYSIS AUTO W/O SCOPE: CPT | Mod: 91 | Performed by: ADVANCED PRACTICE MIDWIFE

## 2024-02-22 PROCEDURE — 99214 OFFICE O/P EST MOD 30 MIN: CPT | Mod: TH | Performed by: ADVANCED PRACTICE MIDWIFE

## 2024-02-22 PROCEDURE — 99214 OFFICE O/P EST MOD 30 MIN: CPT | Performed by: ADVANCED PRACTICE MIDWIFE

## 2024-02-22 ASSESSMENT — ENCOUNTER SYMPTOMS
NEUROLOGICAL NEGATIVE: 0
RESPIRATORY NEGATIVE: 0
ALLERGIC/IMMUNOLOGIC NEGATIVE: 0
MUSCULOSKELETAL NEGATIVE: 0
PSYCHIATRIC NEGATIVE: 0
CONSTITUTIONAL NEGATIVE: 0
CARDIOVASCULAR NEGATIVE: 0
EYES NEGATIVE: 0
HEMATOLOGIC/LYMPHATIC NEGATIVE: 0
GASTROINTESTINAL NEGATIVE: 0
ENDOCRINE NEGATIVE: 0

## 2024-02-22 NOTE — PROGRESS NOTES
"Subjective     Maryjo Dykes is a 23 y.o.  at 36w4d with a working estimated date of delivery of 3/17/2024, by Ultrasound who presents for a routine prenatal visit.     She denies vaginal bleeding, leakage of fluid, decreased fetal movements, or contractions.    Patient states she feel miserable - continues to have nausea and vomiting, unable to keep food down or gain weight  Reports she is unable to keep food down - doesn't feel nauseous but eats solids and it comes back up  Hasn't kept food down in 3-4 days   Takes sips of water, apple juice, Gatorade    Drinks some Boost/ Ensure - has appointment to get signed up with [facility] next month     Taking reglan and zofran only as needed  \"I'm just going to go to [facility], I can't do this anymore\"     Patient states she is taking her iron pills    Objective   Physical Exam:   Weight: 61 kg (134 lb 8 oz)  Expected Total Weight Gain: 11.5 kg (25 lb)-16 kg (35 lb)   Pregravid BMI: 23.25  BP: 117/80 (Pluse-97)  Fetal Heart Rate: 132 Fundal Height (cm): 35 cm             Postpartum Depression: Medium Risk (10/4/2023)    [place]  Depression Scale     Last EPDS Total Score: 6     Last EPDS Self Harm Result: Never        Problem List Items Addressed This Visit       UTI in pregnancy    Anemia affecting pregnancy in third trimester    Nausea and vomiting during pregnancy    36 weeks gestation of pregnancy    High-risk pregnancy in third trimester - Primary    GBS (group b Streptococcus) UTI complicating pregnancy, third trimester    Poor fetal growth affecting management of mother in third trimester    Overview     Growth US  EFW 6% AC 3%, normal dopplers and JARED           Other Visit Diagnoses       Ultrasound for  screening for fetal growth restriction                Anemia - taking iron, most recent ferritin 10, CBC ordered at las visit and not drawn last week with labs - patient to stop at lab today to have completed. Patient is agreeable to IV iron "   FGR - BPP WNL JARED WNL yestday, 2/21, scheduled weekly. Discussed recommended IOL @ 38-39 weeks, patient unhappy with plan and desires earlier IOL   UTI in pregnancy +GBS --> patient left prior to confirm if she completed antibiotics, did not answer phone, will reach out via [email or web address]  Nausea - discussed taking reglan zofran at scheduled interval doses as opposed to prn. Reviewed importance of hydration and discussed taking zofran prior to eating solids. Rx for zofran pump/fluids [facility]. Reviewed signs/symptoms and warning signs and when to present to L&D   Reviewed s/sx of PTL, warning signs, fetal movement counts, and when to call provider  Follow up in 1 week for a routine prenatal visit.      Khushbu Cooley, JUDAH-HARINI, APRN-CNP

## 2024-02-26 ENCOUNTER — TELEPHONE (OUTPATIENT)
Dept: OBSTETRICS AND GYNECOLOGY | Facility: CLINIC | Age: 24
End: 2024-02-26
Payer: MEDICAID

## 2024-02-26 ENCOUNTER — TELEPHONE (OUTPATIENT)
Dept: OBSTETRICS AND GYNECOLOGY | Facility: HOSPITAL | Age: 24
End: 2024-02-26

## 2024-02-26 NOTE — TELEPHONE ENCOUNTER
"Pt calling about induction of labor. \" I'm supposed to be induced at 37 weeks and I am 37 weeks today. I can't keep going through this I am losing weight\" Asked pt if she contacted [employer] for Zofran pump. \" I'm not wearing that thing I'm not doing that. I'm supposed to be induced.\" Informed pt I would send message to Khushbu Cooley CNM  but nausea and vomiting is not an indication for induction at 37 weeks. Per visit note IOL 38-39 wks. Pt again refused Zofran pump. Message sent to provider.  "

## 2024-02-26 NOTE — TELEPHONE ENCOUNTER
Per Khushbu Cooley CNM  at IOL 38-39 wks .Ultrasound this week for growth. Ultrasound apt is scheduled 2/28/24. Called pt reviewed IOL 38-39 wks Ultrasound and OBFU apt as scheduled this week on 2/28/24 and 2/29/24.  Confirmed she is declining Zofran pump. Pt agrees.

## 2024-02-28 ENCOUNTER — HOSPITAL ENCOUNTER (OUTPATIENT)
Dept: RADIOLOGY | Facility: CLINIC | Age: 24
Discharge: HOME | End: 2024-02-28
Payer: MEDICAID

## 2024-02-28 DIAGNOSIS — O36.5990 IUGR (INTRAUTERINE GROWTH RESTRICTION) AFFECTING CARE OF MOTHER (HHS-HCC): ICD-10-CM

## 2024-02-28 DIAGNOSIS — Z03.74 ENCOUNTER FOR SUSPECTED PROBLEM WITH FETAL GROWTH RULED OUT: ICD-10-CM

## 2024-02-28 PROCEDURE — 76820 UMBILICAL ARTERY ECHO: CPT | Performed by: OBSTETRICS & GYNECOLOGY

## 2024-02-28 PROCEDURE — 76820 UMBILICAL ARTERY ECHO: CPT

## 2024-02-28 PROCEDURE — 76819 FETAL BIOPHYS PROFIL W/O NST: CPT

## 2024-02-28 PROCEDURE — 76819 FETAL BIOPHYS PROFIL W/O NST: CPT | Performed by: OBSTETRICS & GYNECOLOGY

## 2024-02-29 ENCOUNTER — ROUTINE PRENATAL (OUTPATIENT)
Dept: OBSTETRICS AND GYNECOLOGY | Facility: CLINIC | Age: 24
End: 2024-02-29
Payer: MEDICAID

## 2024-02-29 ENCOUNTER — HOSPITAL ENCOUNTER (INPATIENT)
Facility: HOSPITAL | Age: 24
LOS: 2 days | Discharge: HOME | End: 2024-03-02
Attending: OBSTETRICS & GYNECOLOGY | Admitting: MIDWIFE
Payer: MEDICAID

## 2024-02-29 ENCOUNTER — ANESTHESIA (OUTPATIENT)
Dept: OBSTETRICS AND GYNECOLOGY | Facility: HOSPITAL | Age: 24
End: 2024-02-29
Payer: MEDICAID

## 2024-02-29 ENCOUNTER — ANESTHESIA EVENT (OUTPATIENT)
Dept: OBSTETRICS AND GYNECOLOGY | Facility: HOSPITAL | Age: 24
End: 2024-02-29
Payer: MEDICAID

## 2024-02-29 VITALS
HEART RATE: 85 BPM | BODY MASS INDEX: 27.69 KG/M2 | WEIGHT: 137 LBS | SYSTOLIC BLOOD PRESSURE: 125 MMHG | DIASTOLIC BLOOD PRESSURE: 86 MMHG

## 2024-02-29 DIAGNOSIS — O21.9 NAUSEA AND VOMITING DURING PREGNANCY (HHS-HCC): ICD-10-CM

## 2024-02-29 DIAGNOSIS — O36.5990 PREGNANCY AFFECTED BY FETAL GROWTH RESTRICTION (HHS-HCC): ICD-10-CM

## 2024-02-29 DIAGNOSIS — Z87.59 HISTORY OF PRE-ECLAMPSIA: ICD-10-CM

## 2024-02-29 DIAGNOSIS — O14.00 MILD PRE-ECLAMPSIA, ANTEPARTUM (HHS-HCC): Primary | ICD-10-CM

## 2024-02-29 DIAGNOSIS — O09.93 HIGH-RISK PREGNANCY IN THIRD TRIMESTER (HHS-HCC): Primary | ICD-10-CM

## 2024-02-29 DIAGNOSIS — O16.3 ELEVATED BLOOD PRESSURE AFFECTING PREGNANCY IN THIRD TRIMESTER, ANTEPARTUM (HHS-HCC): ICD-10-CM

## 2024-02-29 DIAGNOSIS — Z30.017 NEXPLANON INSERTION: ICD-10-CM

## 2024-02-29 DIAGNOSIS — O99.013 ANEMIA AFFECTING PREGNANCY IN THIRD TRIMESTER (HHS-HCC): ICD-10-CM

## 2024-02-29 DIAGNOSIS — Z3A.37 37 WEEKS GESTATION OF PREGNANCY (HHS-HCC): ICD-10-CM

## 2024-02-29 DIAGNOSIS — E61.1 IRON DEFICIENCY: ICD-10-CM

## 2024-02-29 PROBLEM — O14.93 PREECLAMPSIA, THIRD TRIMESTER (HHS-HCC): Status: ACTIVE | Noted: 2024-02-29

## 2024-02-29 LAB
ABO GROUP (TYPE) IN BLOOD: NORMAL
ALBUMIN SERPL BCP-MCNC: 3.7 G/DL (ref 3.4–5)
ALP SERPL-CCNC: 245 U/L (ref 33–110)
ALT SERPL W P-5'-P-CCNC: 5 U/L (ref 7–45)
ANION GAP SERPL CALC-SCNC: 13 MMOL/L (ref 10–20)
ANTIBODY SCREEN: NORMAL
AST SERPL W P-5'-P-CCNC: 9 U/L (ref 9–39)
BILIRUB SERPL-MCNC: 0.4 MG/DL (ref 0–1.2)
BUN SERPL-MCNC: 4 MG/DL (ref 6–23)
CALCIUM SERPL-MCNC: 9.2 MG/DL (ref 8.6–10.6)
CHLORIDE SERPL-SCNC: 103 MMOL/L (ref 98–107)
CO2 SERPL-SCNC: 25 MMOL/L (ref 21–32)
CREAT SERPL-MCNC: 0.54 MG/DL (ref 0.5–1.05)
CREAT UR-MCNC: 118.9 MG/DL (ref 20–320)
EGFRCR SERPLBLD CKD-EPI 2021: >90 ML/MIN/1.73M*2
ERYTHROCYTE [DISTWIDTH] IN BLOOD BY AUTOMATED COUNT: 13.6 % (ref 11.5–14.5)
ERYTHROCYTE [DISTWIDTH] IN BLOOD BY AUTOMATED COUNT: 13.7 % (ref 11.5–14.5)
GLUCOSE SERPL-MCNC: 74 MG/DL (ref 74–99)
HCT VFR BLD AUTO: 28.6 % (ref 36–46)
HCT VFR BLD AUTO: 32.3 % (ref 36–46)
HGB BLD-MCNC: 10.1 G/DL (ref 12–16)
HGB BLD-MCNC: 9.6 G/DL (ref 12–16)
HOLD SPECIMEN: NORMAL
MCH RBC QN AUTO: 27.3 PG (ref 26–34)
MCH RBC QN AUTO: 28.2 PG (ref 26–34)
MCHC RBC AUTO-ENTMCNC: 31.3 G/DL (ref 32–36)
MCHC RBC AUTO-ENTMCNC: 33.6 G/DL (ref 32–36)
MCV RBC AUTO: 84 FL (ref 80–100)
MCV RBC AUTO: 87 FL (ref 80–100)
NRBC BLD-RTO: 0 /100 WBCS (ref 0–0)
NRBC BLD-RTO: 0 /100 WBCS (ref 0–0)
PLATELET # BLD AUTO: 313 X10*3/UL (ref 150–450)
PLATELET # BLD AUTO: 320 X10*3/UL (ref 150–450)
POTASSIUM SERPL-SCNC: 3.3 MMOL/L (ref 3.5–5.3)
PROT SERPL-MCNC: 6.7 G/DL (ref 6.4–8.2)
PROT UR-ACNC: 36 MG/DL (ref 5–24)
PROT/CREAT UR: 0.3 MG/MG CREAT (ref 0–0.17)
RBC # BLD AUTO: 3.41 X10*6/UL (ref 4–5.2)
RBC # BLD AUTO: 3.7 X10*6/UL (ref 4–5.2)
RH FACTOR (ANTIGEN D): NORMAL
SODIUM SERPL-SCNC: 138 MMOL/L (ref 136–145)
TREPONEMA PALLIDUM IGG+IGM AB [PRESENCE] IN SERUM OR PLASMA BY IMMUNOASSAY: NONREACTIVE
WBC # BLD AUTO: 11.9 X10*3/UL (ref 4.4–11.3)
WBC # BLD AUTO: 12 X10*3/UL (ref 4.4–11.3)

## 2024-02-29 PROCEDURE — 80053 COMPREHEN METABOLIC PANEL: CPT | Performed by: MIDWIFE

## 2024-02-29 PROCEDURE — 36415 COLL VENOUS BLD VENIPUNCTURE: CPT | Performed by: MIDWIFE

## 2024-02-29 PROCEDURE — 86780 TREPONEMA PALLIDUM: CPT | Performed by: MIDWIFE

## 2024-02-29 PROCEDURE — 2500000004 HC RX 250 GENERAL PHARMACY W/ HCPCS (ALT 636 FOR OP/ED): Performed by: MIDWIFE

## 2024-02-29 PROCEDURE — 82570 ASSAY OF URINE CREATININE: CPT | Performed by: MIDWIFE

## 2024-02-29 PROCEDURE — 1120000001 HC OB PRIVATE ROOM DAILY

## 2024-02-29 PROCEDURE — 99213 OFFICE O/P EST LOW 20 MIN: CPT | Mod: TH | Performed by: ADVANCED PRACTICE MIDWIFE

## 2024-02-29 PROCEDURE — 86901 BLOOD TYPING SEROLOGIC RH(D): CPT | Performed by: MIDWIFE

## 2024-02-29 PROCEDURE — 2500000004 HC RX 250 GENERAL PHARMACY W/ HCPCS (ALT 636 FOR OP/ED): Performed by: ADVANCED PRACTICE MIDWIFE

## 2024-02-29 PROCEDURE — 99215 OFFICE O/P EST HI 40 MIN: CPT

## 2024-02-29 PROCEDURE — 36415 COLL VENOUS BLD VENIPUNCTURE: CPT

## 2024-02-29 PROCEDURE — 01967 NEURAXL LBR ANES VAG DLVR: CPT | Performed by: PAIN MEDICINE

## 2024-02-29 PROCEDURE — 10907ZC DRAINAGE OF AMNIOTIC FLUID, THERAPEUTIC FROM PRODUCTS OF CONCEPTION, VIA NATURAL OR ARTIFICIAL OPENING: ICD-10-PCS | Performed by: ADVANCED PRACTICE MIDWIFE

## 2024-02-29 PROCEDURE — 2500000005 HC RX 250 GENERAL PHARMACY W/O HCPCS

## 2024-02-29 PROCEDURE — 99213 OFFICE O/P EST LOW 20 MIN: CPT | Performed by: ADVANCED PRACTICE MIDWIFE

## 2024-02-29 PROCEDURE — 85027 COMPLETE CBC AUTOMATED: CPT | Performed by: MIDWIFE

## 2024-02-29 PROCEDURE — 86920 COMPATIBILITY TEST SPIN: CPT

## 2024-02-29 PROCEDURE — 3E033VJ INTRODUCTION OF OTHER HORMONE INTO PERIPHERAL VEIN, PERCUTANEOUS APPROACH: ICD-10-PCS | Performed by: ADVANCED PRACTICE MIDWIFE

## 2024-02-29 RX ORDER — MISOPROSTOL 200 UG/1
800 TABLET ORAL ONCE AS NEEDED
Status: DISCONTINUED | OUTPATIENT
Start: 2024-02-29 | End: 2024-03-01 | Stop reason: HOSPADM

## 2024-02-29 RX ORDER — FENTANYL/BUPIVACAINE/NS/PF 2MCG/ML-.1
PLASTIC BAG, INJECTION (ML) INJECTION CONTINUOUS PRN
Status: DISCONTINUED | OUTPATIENT
Start: 2024-02-29 | End: 2024-03-01

## 2024-02-29 RX ORDER — METHYLERGONOVINE MALEATE 0.2 MG/ML
0.2 INJECTION INTRAVENOUS ONCE AS NEEDED
Status: DISCONTINUED | OUTPATIENT
Start: 2024-02-29 | End: 2024-03-01 | Stop reason: HOSPADM

## 2024-02-29 RX ORDER — CARBOPROST TROMETHAMINE 250 UG/ML
250 INJECTION, SOLUTION INTRAMUSCULAR ONCE AS NEEDED
Status: DISCONTINUED | OUTPATIENT
Start: 2024-02-29 | End: 2024-03-01 | Stop reason: HOSPADM

## 2024-02-29 RX ORDER — LOPERAMIDE HYDROCHLORIDE 2 MG/1
4 CAPSULE ORAL EVERY 2 HOUR PRN
Status: DISCONTINUED | OUTPATIENT
Start: 2024-02-29 | End: 2024-03-01 | Stop reason: HOSPADM

## 2024-02-29 RX ORDER — OXYTOCIN 10 [USP'U]/ML
10 INJECTION, SOLUTION INTRAMUSCULAR; INTRAVENOUS ONCE AS NEEDED
Status: DISCONTINUED | OUTPATIENT
Start: 2024-02-29 | End: 2024-03-01 | Stop reason: HOSPADM

## 2024-02-29 RX ORDER — ACETAMINOPHEN 325 MG/1
975 TABLET ORAL ONCE
Status: COMPLETED | OUTPATIENT
Start: 2024-02-29 | End: 2024-02-29

## 2024-02-29 RX ORDER — PENICILLIN G 3000000 [IU]/50ML
3 INJECTION, SOLUTION INTRAVENOUS EVERY 4 HOURS
Status: DISCONTINUED | OUTPATIENT
Start: 2024-03-01 | End: 2024-03-01

## 2024-02-29 RX ORDER — SODIUM CHLORIDE, SODIUM LACTATE, POTASSIUM CHLORIDE, CALCIUM CHLORIDE 600; 310; 30; 20 MG/100ML; MG/100ML; MG/100ML; MG/100ML
125 INJECTION, SOLUTION INTRAVENOUS CONTINUOUS
Status: DISCONTINUED | OUTPATIENT
Start: 2024-02-29 | End: 2024-03-01

## 2024-02-29 RX ORDER — OXYTOCIN/0.9 % SODIUM CHLORIDE 30/500 ML
2-30 PLASTIC BAG, INJECTION (ML) INTRAVENOUS CONTINUOUS
Status: DISCONTINUED | OUTPATIENT
Start: 2024-02-29 | End: 2024-03-01

## 2024-02-29 RX ORDER — FENTANYL/BUPIVACAINE/NS/PF 2MCG/ML-.1
PLASTIC BAG, INJECTION (ML) INJECTION AS NEEDED
Status: DISCONTINUED | OUTPATIENT
Start: 2024-02-29 | End: 2024-03-01

## 2024-02-29 RX ORDER — OXYTOCIN/0.9 % SODIUM CHLORIDE 30/500 ML
60 PLASTIC BAG, INJECTION (ML) INTRAVENOUS
Status: DISCONTINUED | OUTPATIENT
Start: 2024-02-29 | End: 2024-03-01 | Stop reason: HOSPADM

## 2024-02-29 RX ORDER — TERBUTALINE SULFATE 1 MG/ML
0.25 INJECTION SUBCUTANEOUS ONCE AS NEEDED
Status: DISCONTINUED | OUTPATIENT
Start: 2024-02-29 | End: 2024-03-01 | Stop reason: HOSPADM

## 2024-02-29 RX ORDER — TRANEXAMIC ACID 100 MG/ML
1000 INJECTION, SOLUTION INTRAVENOUS ONCE AS NEEDED
Status: DISCONTINUED | OUTPATIENT
Start: 2024-02-29 | End: 2024-03-01 | Stop reason: HOSPADM

## 2024-02-29 RX ADMIN — Medication 5 ML: at 22:05

## 2024-02-29 RX ADMIN — PENICILLIN G POTASSIUM 5 MILLION UNITS: 5000000 INJECTION, POWDER, FOR SOLUTION INTRAMUSCULAR; INTRAVENOUS at 20:44

## 2024-02-29 RX ADMIN — Medication 2 MILLI-UNITS/MIN: at 20:45

## 2024-02-29 RX ADMIN — ACETAMINOPHEN 975 MG: 325 TABLET ORAL at 16:53

## 2024-02-29 RX ADMIN — Medication 14 ML/HR: at 22:10

## 2024-02-29 RX ADMIN — SODIUM CHLORIDE, POTASSIUM CHLORIDE, SODIUM LACTATE AND CALCIUM CHLORIDE 125 ML/HR: 600; 310; 30; 20 INJECTION, SOLUTION INTRAVENOUS at 19:03

## 2024-02-29 RX ADMIN — Medication 5 ML: at 22:02

## 2024-02-29 RX ADMIN — SODIUM CHLORIDE, POTASSIUM CHLORIDE, SODIUM LACTATE AND CALCIUM CHLORIDE 125 ML/HR: 600; 310; 30; 20 INJECTION, SOLUTION INTRAVENOUS at 23:25

## 2024-02-29 SDOH — HEALTH STABILITY: MENTAL HEALTH: STRENGTHS (MUST CHOOSE TWO): DEMONSTRATES EFFECTIVE COPING SKILLS;SUPPORT FROM FAMILY

## 2024-02-29 SDOH — SOCIAL STABILITY: SOCIAL INSECURITY: VERBAL ABUSE: DENIES

## 2024-02-29 SDOH — SOCIAL STABILITY: SOCIAL INSECURITY: ARE THERE ANY APPARENT SIGNS OF INJURIES/BEHAVIORS THAT COULD BE RELATED TO ABUSE/NEGLECT?: NO

## 2024-02-29 SDOH — SOCIAL STABILITY: SOCIAL INSECURITY: PHYSICAL ABUSE: DENIES

## 2024-02-29 SDOH — SOCIAL STABILITY: SOCIAL INSECURITY: HAVE YOU HAD THOUGHTS OF HARMING ANYONE ELSE?: NO

## 2024-02-29 SDOH — SOCIAL STABILITY: SOCIAL INSECURITY: DO YOU FEEL ANYONE HAS EXPLOITED OR TAKEN ADVANTAGE OF YOU FINANCIALLY OR OF YOUR PERSONAL PROPERTY?: NO

## 2024-02-29 SDOH — ECONOMIC STABILITY: HOUSING INSECURITY: DO YOU FEEL UNSAFE GOING BACK TO THE PLACE WHERE YOU ARE LIVING?: NO

## 2024-02-29 SDOH — HEALTH STABILITY: MENTAL HEALTH: CURRENT SMOKER: 0

## 2024-02-29 SDOH — SOCIAL STABILITY: SOCIAL INSECURITY: ARE YOU OR HAVE YOU BEEN THREATENED OR ABUSED PHYSICALLY, EMOTIONALLY, OR SEXUALLY BY ANYONE?: NO

## 2024-02-29 SDOH — SOCIAL STABILITY: SOCIAL INSECURITY: HAS ANYONE EVER THREATENED TO HURT YOUR FAMILY OR YOUR PETS?: NO

## 2024-02-29 SDOH — HEALTH STABILITY: MENTAL HEALTH: HAVE YOU USED ANY PRESCRIPTION DRUGS OTHER THAN PRESCRIBED IN THE PAST 12 MONTHS?: NO

## 2024-02-29 SDOH — HEALTH STABILITY: MENTAL HEALTH: NON-SPECIFIC ACTIVE SUICIDAL THOUGHTS (PAST 1 MONTH): NO

## 2024-02-29 SDOH — HEALTH STABILITY: MENTAL HEALTH: SUICIDAL BEHAVIOR (LIFETIME): NO

## 2024-02-29 SDOH — HEALTH STABILITY: MENTAL HEALTH: WISH TO BE DEAD (PAST 1 MONTH): NO

## 2024-02-29 SDOH — SOCIAL STABILITY: SOCIAL INSECURITY: ABUSE SCREEN: ADULT

## 2024-02-29 SDOH — HEALTH STABILITY: MENTAL HEALTH: WERE YOU ABLE TO COMPLETE ALL THE BEHAVIORAL HEALTH SCREENINGS?: YES

## 2024-02-29 SDOH — HEALTH STABILITY: MENTAL HEALTH: HAVE YOU USED ANY SUBSTANCES (CANABIS, COCAINE, HEROIN, HALLUCINOGENS, INHALANTS, ETC.) IN THE PAST 12 MONTHS?: NO

## 2024-02-29 SDOH — SOCIAL STABILITY: SOCIAL INSECURITY: DOES ANYONE TRY TO KEEP YOU FROM HAVING/CONTACTING OTHER FRIENDS OR DOING THINGS OUTSIDE YOUR HOME?: NO

## 2024-02-29 ASSESSMENT — LIFESTYLE VARIABLES
AUDIT-C TOTAL SCORE: 0
HOW OFTEN DO YOU HAVE A DRINK CONTAINING ALCOHOL: NEVER
AUDIT-C TOTAL SCORE: 0
HOW MANY STANDARD DRINKS CONTAINING ALCOHOL DO YOU HAVE ON A TYPICAL DAY: PATIENT DOES NOT DRINK
HOW OFTEN DO YOU HAVE 6 OR MORE DRINKS ON ONE OCCASION: NEVER
SKIP TO QUESTIONS 9-10: 1

## 2024-02-29 ASSESSMENT — PATIENT HEALTH QUESTIONNAIRE - PHQ9
2. FEELING DOWN, DEPRESSED OR HOPELESS: NOT AT ALL
1. LITTLE INTEREST OR PLEASURE IN DOING THINGS: NOT AT ALL
SUM OF ALL RESPONSES TO PHQ9 QUESTIONS 1 & 2: 0

## 2024-02-29 ASSESSMENT — PAIN SCALES - GENERAL
PAINLEVEL_OUTOF10: 6
PAINLEVEL_OUTOF10: 0 - NO PAIN
PAINLEVEL_OUTOF10: 1

## 2024-02-29 ASSESSMENT — ACTIVITIES OF DAILY LIVING (ADL): LACK_OF_TRANSPORTATION: NO

## 2024-02-29 NOTE — H&P
Obstetrical Admission History and Physical     Maryjo Dykes is a 23 y.o. . She was sent over from [facility] off with a headache and mild ranged BP x 1.  Headaches resolved after tylenol    Chief Complaint: Hypertension- preeclampsia without severe features  Pregnancy notable for:  -GBS pos  -EFW 5#8  FGR 2024 AC 3% EFW 6%  UTI x 1   Anemia- admission Hg 10.1    PMH: depression no meds  PSH: denies  GYN hx:  UTI in pregnancy  OBHx:  svb x 4- 3 PT : sab x 1  PEC in 2 pregnancies: FGR x 4  Fam hx: noncontributory  Meds:pnv, feso4 325  Social hx: denies t/e/d     Assessment/Plan    22 y/o  at 37.4 weeks  PEC w/out SF  FGR   Favorable cervix at term  Latent labor  Gbs pos    P: Admit to labor and delivery       OB admission labs, cmp, urine p/c      Monitor vital signs per unit protocol      Encourage frequent position changes      Regular diet until pitocin or epidural      Pain management per patient request      Continue assessment of maternal and fetal well-being      Recheck as clinically indicted by maternal or fetal status      Anticipate SVB      For BTL        _Andi____ aware of admission    ERICK Lyman     Principal Problem:    Preeclampsia, third trimester      Pregnancy Problems (from 23 to present)       Problem Noted Resolved    37 weeks gestation of pregnancy 2024 by ERICK Campa, APRN-CNP No    Priority:  Medium      Elevated blood pressure affecting pregnancy in third trimester, antepartum 2024 by ERICK Campa, APRN-CNP No    Priority:  Medium      Preeclampsia, third trimester 2024 by ERICK Matos No    Priority:  Medium      36 weeks gestation of pregnancy 2024 by ERICK Campa, APRN-CNP No    Priority:  Medium      High-risk pregnancy in third trimester 2024 by ERICK Campa, APRN-CNP No    Priority:  Medium      GBS (group b Streptococcus) UTI complicating pregnancy, third trimester 2024  by ERICK Campa, APRN-CNP No    Priority:  Medium      Poor fetal growth affecting management of mother in third trimester 2/22/2024 by ERICK Campa, APRN-CNP No    Priority:  Medium      Overview Signed 2/22/2024 11:54 AM by ERICK Campa APRN-CNP     Growth US 2/14 EFW 6% AC 3%, normal dopplers and JARED          Anemia affecting pregnancy in third trimester 2/8/2024 by ERICK Campa, APRN-CNP No    Priority:  Medium      Nausea and vomiting during pregnancy 2/8/2024 by ERICK Campa, APRN-CNP No    Priority:  Medium      High risk pregnancy case management patient in second trimester 12/20/2023 by Elijah Ariza MD No    Priority:  Medium      Overview Addendum 2/8/2024 11:48 AM by ERICK Campa APRN-CNP     [x] dating US  [x] PNLs reviewed wnl: rubella equivocal for MMR pp  [] Pap  [x] NT wnl   [] cell-free DNA  [x] Anatomy US  [x] 1 hour OGTT: collected 12/20  [x] 2nd trimester labs: collected 12/20  [x] Tdap: done 12/20  [x] flu/covid - declines 12/20, will think about it  [] GBS  [] Delivery Planning  [x] PPBC - tubal, consent signed 12/20           ADHD 12/20/2023 by Elijah Ariza MD No    Priority:  Medium      Overview Addendum 12/20/2023 12:12 PM by Elijah Ariza MD     PRN aderrall use         Pregnancy affected by fetal growth restriction 10/3/2023 by Ani Sparks No    Priority:  Medium      Overview Addendum 2/29/2024  2:23 PM by ERICK Campa, JUDAH-CNP     Growth US 2/14 AC 3% EFW 6%  Diagnosed on 11/16/23    Serial Growth US q 3 weeks  11/16/23 EFW 10%, AC 2%  12/13/23 805g 10%, AC 2%, FGR normal dopplers     BPP and UA Doppler weekly   Timing of delivery:   EFW 3-9%: 38 0/7 - 39 0/7  EFW 1-2%: 37wga                 Subjective   Amyia is here complaining of high blood pressure. Good fetal movement. Denies vaginal bleeding., Denies contractions., Denies leaking of fluid.      C/o headache that resolved with tylenol     Obstetrical  History   OB History    Para Term  AB Living   6 4 1 3 1 4   SAB IAB Ectopic Multiple Live Births   1 0 0 0 4      # Outcome Date GA Lbr Campbell/2nd Weight Sex Delivery Anes PTL Lv   6 Current            5  10/22/22 36w0d  2.126 kg F Vag-Spont  Y SUSANNAH   4  21 36w6d    Vag-Spont  Y SUSANNAH   3  20 34w0d  2.07 kg F Vag-Spont None Y SUSANNAH   2 Term 18 37w6d  1.984 kg M Vag-Spont EPI N SUSANNAH   1 SAB 2016              Obstetric Comments   2023 4:58 PM - MR  Pt no-showed appointment today for MFM consult with Dr. Bar March        Past Medical History  Past Medical History:   Diagnosis Date    Depression     History of pre-eclampsia     History of  delivery, currently pregnant     Hyperemesis affecting pregnancy, antepartum     Pregnancy     Psychiatric problem     ADHD, BIPOLAR, SCHIZOPHRENIA    UTI in pregnancy, antepartum         Past Surgical History   No past surgical history on file.    Social History  Social History     Tobacco Use    Smoking status: Never    Smokeless tobacco: Never   Substance Use Topics    Alcohol use: Never     Substance and Sexual Activity   Drug Use Never       Allergies  Milk     Medications  Medications Prior to Admission   Medication Sig Dispense Refill Last Dose    aspirin 81 mg EC tablet Take 2 tablets (162 mg) by mouth once daily. 60 tablet 1 2024    iron, carbonyl (Iron Chews) 15 mg tablet,chewable Chew 1 tablet 2 times a day. 60 each 2 2024    metoclopramide (Reglan) 10 mg tablet Take 1 tablet (10 mg) by mouth every 6 hours if needed (nausea/vomiting). 60 tablet 2 Past Month    ondansetron (Zofran) 4 mg tablet Take by mouth.   Past Month    ondansetron ODT (Zofran-ODT) 4 mg disintegrating tablet Take 1 tablet (4 mg) by mouth every 8 hours if needed for nausea or vomiting. 60 tablet 1 Past Month    PNV no.95/ferrous fum/folic ac (PRENATAL ORAL) Take by mouth.   2024       Objective    Last Vitals  Temp Pulse Resp BP  "MAP O2 Sat   36.2 °C (97.1 °F) (Simultaneous filing. User may not have seen previous data.) 85 16 (!) 135/94   98 %     Physical Examination  GENERAL: Examination reveals a well developed, well nourished, gravid female in no acute distress. She is alert and cooperative.  HEENT: PERRLA. External ears normal. Nose normal, no erythema or discharge. Mouth and throat clear.  FHR is  , with Accelerations, and a Category I tracing.    [place] reading:    The fetus is in a vertex presentation, determined by ultrasound  Current Estimated Fetal Weight 5#8 established by Leopold's maneuver  VAGINA: normal appearing vagina with normal color and discharge and no lesions noted  CERVIX:   2.5cm dilated,   50% effaced, -2  station; MEMBRANES are Intact  SKIN: normal coloration and turgor, no rashes  PSYCHOLOGICAL: awake and alert; oriented to person, place, and time    Lab Review  Lab Results   Component Value Date    WBC 12.0 (H) 02/29/2024    HGB 10.1 (L) 02/29/2024    HCT 32.3 (L) 02/29/2024     02/29/2024     No results found for: \"GRPBSTREP\"  Lab Results   Component Value Date    GLUCOSE 74 02/29/2024     02/29/2024    K 3.3 (L) 02/29/2024     02/29/2024    CO2 25 02/29/2024    ANIONGAP 13 02/29/2024    BUN 4 (L) 02/29/2024    CREATININE 0.54 02/29/2024    EGFR >90 02/29/2024    CALCIUM 9.2 02/29/2024    ALBUMIN 3.7 02/29/2024    PROT 6.7 02/29/2024    ALKPHOS 245 (H) 02/29/2024    ALT 5 (L) 02/29/2024    AST 9 02/29/2024    BILITOT 0.4 02/29/2024     Lab Results   Component Value Date    UTPCR 0.30 (H) 02/29/2024       "

## 2024-02-29 NOTE — PROGRESS NOTES
"Subjective     Maryjo Dykes is a 23 y.o.  at 37w4d with a working estimated date of delivery of 3/17/2024, by Ultrasound who presents for a routine prenatal visit.     She denies vaginal bleeding, leakage of fluid, decreased fetal movements, or contractions.  Reports a \"slight\" headache off and on for the past couple of days. Denies visual changes. Reports feeling \"pressure\" and discomfort at RUQ\"    Objective   Physical Exam:   Weight: 62.1 kg (137 lb)  Expected Total Weight Gain: 11.5 kg (25 lb)-16 kg (35 lb)   Pregravid BMI: 23.25  BP: 132/90, 125/86                  Postpartum Depression: Medium Risk (10/4/2023)    [place]  Depression Scale     Last EPDS Total Score: 6     Last EPDS Self Harm Result: Never        Problem List Items Addressed This Visit       Pregnancy affected by fetal growth restriction    Overview     Growth US  AC 3% EFW 6%  Diagnosed on 23    Serial Growth US q 3 weeks  23 EFW 10%, AC 2%  23 805g 10%, AC 2%, FGR normal dopplers     BPP and UA Doppler weekly   Timing of delivery:   EFW 3-9%: 38 0/7 - 39 0/7  EFW 1-2%: 37wga           History of pre-eclampsia    Anemia affecting pregnancy in third trimester    High-risk pregnancy in third trimester - Primary    37 weeks gestation of pregnancy    Elevated blood pressure affecting pregnancy in third trimester, antepartum       Patient sent to L&D for evaluation of elevated BP. Report called to L&D triage RN and Christ SCHULER  Discussed IOL @ 38-39 wks for FGR if not induced for gHTN vs PEC today.   Patient agrees with plan and will report to L&D     Khushbu Cooley, JUDAH-HARINI, APRN-CNP    "

## 2024-03-01 PROBLEM — O36.5990 PREGNANCY AFFECTED BY FETAL GROWTH RESTRICTION (HHS-HCC): Status: RESOLVED | Noted: 2023-10-03 | Resolved: 2024-03-01

## 2024-03-01 PROBLEM — O03.9 SPONTANEOUS ABORTION (HHS-HCC): Status: RESOLVED | Noted: 2023-10-03 | Resolved: 2024-03-01

## 2024-03-01 PROBLEM — O21.9 NAUSEA AND VOMITING DURING PREGNANCY (HHS-HCC): Status: RESOLVED | Noted: 2024-02-08 | Resolved: 2024-03-01

## 2024-03-01 PROBLEM — O21.0 HYPEREMESIS OF PREGNANCY (HHS-HCC): Status: RESOLVED | Noted: 2023-10-03 | Resolved: 2024-03-01

## 2024-03-01 PROBLEM — Z3A.36 36 WEEKS GESTATION OF PREGNANCY (HHS-HCC): Status: RESOLVED | Noted: 2024-02-22 | Resolved: 2024-03-01

## 2024-03-01 PROBLEM — O09.33 INSUFFICIENT PRENATAL CARE IN THIRD TRIMESTER (HHS-HCC): Status: RESOLVED | Noted: 2023-10-03 | Resolved: 2024-03-01

## 2024-03-01 PROBLEM — O23.40 UTI IN PREGNANCY (HHS-HCC): Status: RESOLVED | Noted: 2023-10-03 | Resolved: 2024-03-01

## 2024-03-01 PROBLEM — Z3A.37 37 WEEKS GESTATION OF PREGNANCY (HHS-HCC): Status: RESOLVED | Noted: 2024-02-29 | Resolved: 2024-03-01

## 2024-03-01 PROCEDURE — 59409 OBSTETRICAL CARE: CPT | Performed by: ADVANCED PRACTICE MIDWIFE

## 2024-03-01 PROCEDURE — 88307 TISSUE EXAM BY PATHOLOGIST: CPT | Mod: TC,SUR | Performed by: OBSTETRICS & GYNECOLOGY

## 2024-03-01 PROCEDURE — 2500000005 HC RX 250 GENERAL PHARMACY W/O HCPCS: Performed by: ADVANCED PRACTICE MIDWIFE

## 2024-03-01 PROCEDURE — 2500000002 HC RX 250 W HCPCS SELF ADMINISTERED DRUGS (ALT 637 FOR MEDICARE OP, ALT 636 FOR OP/ED): Performed by: NURSE PRACTITIONER

## 2024-03-01 PROCEDURE — 1100000001 HC PRIVATE ROOM DAILY

## 2024-03-01 PROCEDURE — 88307 TISSUE EXAM BY PATHOLOGIST: CPT | Performed by: PATHOLOGY

## 2024-03-01 PROCEDURE — 2500000001 HC RX 250 WO HCPCS SELF ADMINISTERED DRUGS (ALT 637 FOR MEDICARE OP): Performed by: NURSE PRACTITIONER

## 2024-03-01 RX ORDER — ACETAMINOPHEN 325 MG/1
975 TABLET ORAL EVERY 6 HOURS
Status: DISCONTINUED | OUTPATIENT
Start: 2024-03-01 | End: 2024-03-01

## 2024-03-01 RX ORDER — LOPERAMIDE HYDROCHLORIDE 2 MG/1
4 CAPSULE ORAL EVERY 2 HOUR PRN
Status: DISCONTINUED | OUTPATIENT
Start: 2024-03-01 | End: 2024-03-02 | Stop reason: HOSPADM

## 2024-03-01 RX ORDER — NIFEDIPINE 30 MG/1
30 TABLET, FILM COATED, EXTENDED RELEASE ORAL
Status: DISCONTINUED | OUTPATIENT
Start: 2024-03-01 | End: 2024-03-02

## 2024-03-01 RX ORDER — LABETALOL HYDROCHLORIDE 5 MG/ML
20 INJECTION, SOLUTION INTRAVENOUS ONCE AS NEEDED
Status: DISCONTINUED | OUTPATIENT
Start: 2024-03-01 | End: 2024-03-02 | Stop reason: HOSPADM

## 2024-03-01 RX ORDER — ONDANSETRON HYDROCHLORIDE 2 MG/ML
4 INJECTION, SOLUTION INTRAVENOUS EVERY 6 HOURS PRN
Status: DISCONTINUED | OUTPATIENT
Start: 2024-03-01 | End: 2024-03-02 | Stop reason: HOSPADM

## 2024-03-01 RX ORDER — OXYTOCIN 10 [USP'U]/ML
10 INJECTION, SOLUTION INTRAMUSCULAR; INTRAVENOUS ONCE AS NEEDED
Status: DISCONTINUED | OUTPATIENT
Start: 2024-03-01 | End: 2024-03-02 | Stop reason: HOSPADM

## 2024-03-01 RX ORDER — LIDOCAINE 560 MG/1
1 PATCH PERCUTANEOUS; TOPICAL; TRANSDERMAL
Status: DISCONTINUED | OUTPATIENT
Start: 2024-03-01 | End: 2024-03-02 | Stop reason: HOSPADM

## 2024-03-01 RX ORDER — ADHESIVE BANDAGE
30 BANDAGE TOPICAL
Status: DISCONTINUED | OUTPATIENT
Start: 2024-03-01 | End: 2024-03-02 | Stop reason: HOSPADM

## 2024-03-01 RX ORDER — CARBOPROST TROMETHAMINE 250 UG/ML
250 INJECTION, SOLUTION INTRAMUSCULAR ONCE AS NEEDED
Status: DISCONTINUED | OUTPATIENT
Start: 2024-03-01 | End: 2024-03-02 | Stop reason: HOSPADM

## 2024-03-01 RX ORDER — POTASSIUM CHLORIDE 20 MEQ/1
40 TABLET, EXTENDED RELEASE ORAL ONCE
Status: DISCONTINUED | OUTPATIENT
Start: 2024-03-01 | End: 2024-03-01

## 2024-03-01 RX ORDER — TRANEXAMIC ACID 100 MG/ML
1000 INJECTION, SOLUTION INTRAVENOUS ONCE AS NEEDED
Status: DISCONTINUED | OUTPATIENT
Start: 2024-03-01 | End: 2024-03-02 | Stop reason: HOSPADM

## 2024-03-01 RX ORDER — ONDANSETRON 4 MG/1
4 TABLET, FILM COATED ORAL EVERY 6 HOURS PRN
Status: DISCONTINUED | OUTPATIENT
Start: 2024-03-01 | End: 2024-03-02 | Stop reason: HOSPADM

## 2024-03-01 RX ORDER — SIMETHICONE 80 MG
80 TABLET,CHEWABLE ORAL 4 TIMES DAILY PRN
Status: DISCONTINUED | OUTPATIENT
Start: 2024-03-01 | End: 2024-03-02 | Stop reason: HOSPADM

## 2024-03-01 RX ORDER — DIPHENHYDRAMINE HCL 25 MG
25 CAPSULE ORAL EVERY 6 HOURS PRN
Status: DISCONTINUED | OUTPATIENT
Start: 2024-03-01 | End: 2024-03-02 | Stop reason: HOSPADM

## 2024-03-01 RX ORDER — POTASSIUM CHLORIDE 1.5 G/1.58G
40 POWDER, FOR SOLUTION ORAL ONCE
Status: COMPLETED | OUTPATIENT
Start: 2024-03-01 | End: 2024-03-01

## 2024-03-01 RX ORDER — POLYETHYLENE GLYCOL 3350 17 G/17G
17 POWDER, FOR SOLUTION ORAL 2 TIMES DAILY PRN
Status: DISCONTINUED | OUTPATIENT
Start: 2024-03-01 | End: 2024-03-02 | Stop reason: HOSPADM

## 2024-03-01 RX ORDER — METHYLERGONOVINE MALEATE 0.2 MG/ML
0.2 INJECTION INTRAVENOUS ONCE AS NEEDED
Status: DISCONTINUED | OUTPATIENT
Start: 2024-03-01 | End: 2024-03-02 | Stop reason: HOSPADM

## 2024-03-01 RX ORDER — IBUPROFEN 600 MG/1
600 TABLET ORAL EVERY 6 HOURS
Status: DISCONTINUED | OUTPATIENT
Start: 2024-03-01 | End: 2024-03-01

## 2024-03-01 RX ORDER — BISACODYL 10 MG/1
10 SUPPOSITORY RECTAL DAILY PRN
Status: DISCONTINUED | OUTPATIENT
Start: 2024-03-01 | End: 2024-03-02 | Stop reason: HOSPADM

## 2024-03-01 RX ORDER — DIPHENHYDRAMINE HYDROCHLORIDE 50 MG/ML
25 INJECTION INTRAMUSCULAR; INTRAVENOUS EVERY 6 HOURS PRN
Status: DISCONTINUED | OUTPATIENT
Start: 2024-03-01 | End: 2024-03-02 | Stop reason: HOSPADM

## 2024-03-01 RX ORDER — LIDOCAINE HYDROCHLORIDE 10 MG/ML
30 INJECTION INFILTRATION; PERINEURAL ONCE AS NEEDED
Status: COMPLETED | OUTPATIENT
Start: 2024-03-01 | End: 2024-03-02

## 2024-03-01 RX ORDER — TRIPROLIDINE/PSEUDOEPHEDRINE 2.5MG-60MG
600 TABLET ORAL EVERY 6 HOURS
Status: DISCONTINUED | OUTPATIENT
Start: 2024-03-01 | End: 2024-03-02 | Stop reason: HOSPADM

## 2024-03-01 RX ORDER — OXYTOCIN/0.9 % SODIUM CHLORIDE 30/500 ML
60 PLASTIC BAG, INJECTION (ML) INTRAVENOUS ONCE AS NEEDED
Status: DISCONTINUED | OUTPATIENT
Start: 2024-03-01 | End: 2024-03-02 | Stop reason: HOSPADM

## 2024-03-01 RX ORDER — NIFEDIPINE 10 MG/1
10 CAPSULE ORAL ONCE AS NEEDED
Status: DISCONTINUED | OUTPATIENT
Start: 2024-03-01 | End: 2024-03-02 | Stop reason: HOSPADM

## 2024-03-01 RX ORDER — METOCLOPRAMIDE HYDROCHLORIDE 5 MG/ML
10 INJECTION INTRAMUSCULAR; INTRAVENOUS EVERY 6 HOURS PRN
Status: DISCONTINUED | OUTPATIENT
Start: 2024-03-01 | End: 2024-03-02 | Stop reason: HOSPADM

## 2024-03-01 RX ORDER — MISOPROSTOL 200 UG/1
800 TABLET ORAL ONCE AS NEEDED
Status: DISCONTINUED | OUTPATIENT
Start: 2024-03-01 | End: 2024-03-02 | Stop reason: HOSPADM

## 2024-03-01 RX ORDER — ACETAMINOPHEN 160 MG/5ML
975 SUSPENSION ORAL EVERY 6 HOURS
Status: DISCONTINUED | OUTPATIENT
Start: 2024-03-01 | End: 2024-03-02 | Stop reason: HOSPADM

## 2024-03-01 RX ORDER — HYDRALAZINE HYDROCHLORIDE 20 MG/ML
5 INJECTION INTRAMUSCULAR; INTRAVENOUS ONCE AS NEEDED
Status: DISCONTINUED | OUTPATIENT
Start: 2024-03-01 | End: 2024-03-02 | Stop reason: HOSPADM

## 2024-03-01 RX ORDER — METOCLOPRAMIDE 10 MG/1
10 TABLET ORAL EVERY 6 HOURS PRN
Status: DISCONTINUED | OUTPATIENT
Start: 2024-03-01 | End: 2024-03-02 | Stop reason: HOSPADM

## 2024-03-01 RX ADMIN — NIFEDIPINE 30 MG: 30 TABLET, FILM COATED, EXTENDED RELEASE ORAL at 09:25

## 2024-03-01 RX ADMIN — IBUPROFEN 600 MG: 200 SUSPENSION ORAL at 22:30

## 2024-03-01 RX ADMIN — IBUPROFEN 600 MG: 200 SUSPENSION ORAL at 16:25

## 2024-03-01 RX ADMIN — LIDOCAINE 1 PATCH: 4 PATCH TOPICAL at 14:33

## 2024-03-01 RX ADMIN — ACETAMINOPHEN 1000 MG: 160 SUSPENSION ORAL at 16:24

## 2024-03-01 RX ADMIN — POTASSIUM CHLORIDE 40 MEQ: 1.5 POWDER, FOR SOLUTION ORAL at 18:42

## 2024-03-01 ASSESSMENT — PAIN SCALES - GENERAL
PAINLEVEL_OUTOF10: 0 - NO PAIN
PAINLEVEL_OUTOF10: 5 - MODERATE PAIN
PAINLEVEL_OUTOF10: 0 - NO PAIN

## 2024-03-01 ASSESSMENT — PAIN DESCRIPTION - LOCATION: LOCATION: BACK

## 2024-03-01 NOTE — ANESTHESIA PREPROCEDURE EVALUATION
"Patient: Maryjo Dykes    Evaluation Method: In-person visit    Procedure Information    Date: 02/29/24  Procedure: Labor Analgesia         Relevant Problems   Anesthesia  Epidurals x 2, w/o complications      Cardiovascular   (+) Preeclampsia, third trimester      Endocrine (within normal limits)      GI (within normal limits)      /Renal   (+) GBS (group b Streptococcus) UTI complicating pregnancy, third trimester   (+) UTI in pregnancy      Neuro/Psych   (+) ADHD   (+) Depression affecting pregnancy      Pulmonary (within normal limits)      GI/Hepatic (within normal limits)      Hematology   (+) Anemia   (+) Anemia affecting pregnancy in third trimester      Musculoskeletal (within normal limits)      Eyes, Ears, Nose, and Throat (within normal limits)      Infectious Disease   (+) GBS (group b Streptococcus) UTI complicating pregnancy, third trimester   (+) UTI in pregnancy       Clinical information reviewed:    Allergies  Meds  Problems              NPO Detail:  No data recorded     OB/Gyn Evaluation    Present Pregnancy    Patient is pregnant now.   Obstetric History           Visit Vitals  /87   Pulse 71   Temp 36.4 °C (97.5 °F) (Temporal)   Resp 17   Ht 1.499 m (4' 11\")   Wt 62.7 kg (138 lb 3.7 oz)   SpO2 98%   BMI 27.92 kg/m²   OB Status Pregnant   Smoking Status Never   BSA 1.62 m²         Physical Exam    Airway  Mallampati: I  TM distance: >3 FB  Neck ROM: full     Cardiovascular    Dental   Comments: Chipped left upper lateral incisor   Pulmonary    Abdominal            Anesthesia Plan    History of general anesthesia?: no  History of complications of general anesthesia?: unknown/emergency    ASA 3     epidural     The patient is not a current smoker.    Anesthetic plan and risks discussed with patient.  Use of blood products discussed with patient who.    Plan discussed with CAA and attending.        "

## 2024-03-01 NOTE — CARE PLAN
Problem: Postpartum  Goal: Minimal s/sx of HDP and BP<160/110  Outcome: Progressing     Problem: Pain - Adult  Goal: Verbalizes/displays adequate comfort level or baseline comfort level  Outcome: Met     Problem: Safety - Adult  Goal: Free from fall injury  Outcome: Met   The patient's goals for the shift include  be discharged tomorrow    The clinical goals for the shift include blood pressures WDL     The patient had a severe range blood pressure...nifed 30 given. Pressures since have been stable. Pain well controlled will continue to monitor.

## 2024-03-01 NOTE — PROGRESS NOTES
Pt now resting comfortably with an epidural, agreeable to arom.  /71.  , mod variability, +accels, no decels.  [place] q2-4 min.  Pit @ 2mu.  SVE 3/70/-2, arom'd for clear fluid.  Continue pit and pcn, continue to monitor BP and for severe features of preeclampsia.  Anticipate svb.    Roseann sadler cnm

## 2024-03-01 NOTE — ANESTHESIA PROCEDURE NOTES
Epidural Block    Patient location during procedure: floor  Start time: 2/29/2024 9:45 PM  End time: 2/29/2024 10:05 PM  Reason for block: labor analgesia  Staffing  Performed: resident   Authorized by: Boogie Moctezuma MD    Performed by: Kailash Steele DO    Preanesthetic Checklist  Completed: patient identified, IV checked, risks and benefits discussed, surgical consent, pre-op evaluation, timeout performed and sterile techniques followed  Block Timeout  RN/Licensed healthcare professional reads aloud to the Anesthesia provider and entire team: Patient identity, procedure with side and site, patient position, and as applicable the availability of implants/special equipment/special requirements.  Patient on coagulant treatment: no  Timeout performed at: 2/29/2024 9:45 PM  Block Placement  Patient position: sitting  Prep: ChloraPrep  Sterility prep: drape, gloves, hand, mask and cap  Sedation level: no sedation  Patient monitoring: blood pressure, continuous pulse oximetry and heart rate  Approach: midline  Local numbing: lidocaine 1% to skin and subcutaneous tissues  Vertebral space: lumbar  Lumbar location: L3-L4  Epidural  Loss of resistance technique: saline  Guidance: landmark technique        Needle  Needle type: Tuohy   Needle gauge: 17  Needle length: 10 cm  Needle insertion depth: 6 cm  Catheter type: multi-orifice  Catheter size: 19 G  Catheter at skin depth: 11 cm  Catheter securement method: clear occlusive dressing    Test dose: lidocaine 1.5% with epinephrine 1-to-200,000  Test dose: lidocaine 1.5% with epinephrine 1-to-200,000  Test dose result: no positive test dose    PCEA  Medication concentration used: 0.044% Bupivacaine with 1.25 mcg/mL Fentanyl and [id number] Epinephrine  Dose (mL): 10  Lockout (minutes): 15  1-Hour Limit (boluses/hr): 4  Basal Rate: 14        Assessment  Sensory level: T6 bilateral  Events: no positive test dose

## 2024-03-01 NOTE — CARE PLAN
The patient's goals for the shift include      The clinical goals for the shift include DECREASE BP

## 2024-03-01 NOTE — SIGNIFICANT EVENT
BP Cuff and Home Monitoring   Patient meets criteria for home monitoring of blood pressure post discharge.  Met with patient to assess for availability of home BP monitor.  Patient does not have access to BP monitor at home.  Pt agreed to order home BP monitor from [employer]. BP monitor delivered to room.  Patient educated on how to use BP monitor, recording BP’s on home monitoring log and s/sx to report to her provider.  Pt verbalized understanding the above information.    Large cuff given

## 2024-03-01 NOTE — PROGRESS NOTES
Social Work Assessment     Patient: Maryjo Dykes, 22yo,   Address: [address] [place], [place] [address]on, [facility]  Phone: [phone]    Referral Reason: history of depression/bipolar per chart    Prenatal Care: [facility] x 5  Barriers: denies    [place] Name: Andre Kim  [place] : [birth year]    Other Children:   Latrell - 5yo  Livia - 5yo  Jacinto Kim, . 4yo  2yo born at [facility]  Andre Kim -     FOB: Jacinto Kim    Household Composition: Ms Cordero reports she lives alone with her children in a stable and appropriate home with working utilities.     Supports: Ms Dykes reports her mother Araseli, sister Ana, (present) and FOB are her primary supports. Mother and sister are present. Ms Cordero reports other family is also supportive and is assisting with care of older children this admission.     IPV/DV or Safety Concerns: Ms Dykes denies IPV/safety concerns at this time.     Car-Seat: yes - in room  Safe Sleep Space: yes - pack-and-play in Ms Dykes's room  Safe Sleep Education: reviewed  Ms Dykes reports her other children did well with safe sleep and still sleep independently.     Transportation Concerns: denies, Ms Dykes reports her grandmother generally assists    School/Work/Income: Ms Dykes reports she lives in low-income home. She reports she receives food stamps, medical benefits, and WIC. She denies needs and concerns at this time.     Insurance: [employer] plan; SW reviewed process, timeline, and importance of adding  to insurance.    Substance Use History: denies    Mental Health Diagnoses/Concerns: Per chart, Ms Dykes with history of bipolar affective/depression, and ADHD. Ms Dykes denies signs, symptoms and concerns. She states her mood has been stable at baseline. She states she is not connected to mental health services and denies need. SW reviewed postpartum depression signs, symptoms, and resources and Ms Dykes indicated understanding.    Bonding: No concerns noted    Department of Children  and Family Services (DCFS): Denies history and current involvement, no concerns noted.     Assessment: SW met with Ms Dykes for assessment. She was accepting and engaged. She reports she has needed items for  and good support. She denies concerns and reports her mood is stable. No needs identified.     Plan: Ms Dykes and  clear from SW perspective.     Signature: TOMMY Miller

## 2024-03-01 NOTE — DISCHARGE INSTRUCTIONS

## 2024-03-02 VITALS
OXYGEN SATURATION: 95 % | BODY MASS INDEX: 27.87 KG/M2 | HEIGHT: 59 IN | SYSTOLIC BLOOD PRESSURE: 115 MMHG | HEART RATE: 96 BPM | RESPIRATION RATE: 16 BRPM | TEMPERATURE: 97.7 F | DIASTOLIC BLOOD PRESSURE: 75 MMHG | WEIGHT: 138.23 LBS

## 2024-03-02 LAB
ALBUMIN SERPL BCP-MCNC: 3.1 G/DL (ref 3.4–5)
ALP SERPL-CCNC: 173 U/L (ref 33–110)
ALT SERPL W P-5'-P-CCNC: 4 U/L (ref 7–45)
ANION GAP SERPL CALC-SCNC: 13 MMOL/L (ref 10–20)
AST SERPL W P-5'-P-CCNC: 10 U/L (ref 9–39)
BILIRUB SERPL-MCNC: 0.3 MG/DL (ref 0–1.2)
BUN SERPL-MCNC: 5 MG/DL (ref 6–23)
CALCIUM SERPL-MCNC: 9 MG/DL (ref 8.6–10.6)
CHLORIDE SERPL-SCNC: 103 MMOL/L (ref 98–107)
CO2 SERPL-SCNC: 24 MMOL/L (ref 21–32)
CREAT SERPL-MCNC: 0.55 MG/DL (ref 0.5–1.05)
EGFRCR SERPLBLD CKD-EPI 2021: >90 ML/MIN/1.73M*2
ERYTHROCYTE [DISTWIDTH] IN BLOOD BY AUTOMATED COUNT: 13.4 % (ref 11.5–14.5)
GLUCOSE SERPL-MCNC: 73 MG/DL (ref 74–99)
HCT VFR BLD AUTO: 27.4 % (ref 36–46)
HGB BLD-MCNC: 9 G/DL (ref 12–16)
MCH RBC QN AUTO: 27.2 PG (ref 26–34)
MCHC RBC AUTO-ENTMCNC: 32.8 G/DL (ref 32–36)
MCV RBC AUTO: 83 FL (ref 80–100)
NRBC BLD-RTO: 0 /100 WBCS (ref 0–0)
PLATELET # BLD AUTO: 311 X10*3/UL (ref 150–450)
POTASSIUM SERPL-SCNC: 3.8 MMOL/L (ref 3.5–5.3)
PROT SERPL-MCNC: 6 G/DL (ref 6.4–8.2)
RBC # BLD AUTO: 3.31 X10*6/UL (ref 4–5.2)
SODIUM SERPL-SCNC: 136 MMOL/L (ref 136–145)
WBC # BLD AUTO: 11 X10*3/UL (ref 4.4–11.3)

## 2024-03-02 PROCEDURE — 80053 COMPREHEN METABOLIC PANEL: CPT | Performed by: NURSE PRACTITIONER

## 2024-03-02 PROCEDURE — 11981 INSERTION DRUG DLVR IMPLANT: CPT | Performed by: NURSE PRACTITIONER

## 2024-03-02 PROCEDURE — 2500000001 HC RX 250 WO HCPCS SELF ADMINISTERED DRUGS (ALT 637 FOR MEDICARE OP): Performed by: NURSE PRACTITIONER

## 2024-03-02 PROCEDURE — 0JHD3HZ INSERTION OF CONTRACEPTIVE DEVICE INTO RIGHT UPPER ARM SUBCUTANEOUS TISSUE AND FASCIA, PERCUTANEOUS APPROACH: ICD-10-PCS | Performed by: NURSE PRACTITIONER

## 2024-03-02 PROCEDURE — 2500000004 HC RX 250 GENERAL PHARMACY W/ HCPCS (ALT 636 FOR OP/ED): Performed by: NURSE PRACTITIONER

## 2024-03-02 PROCEDURE — 2500000002 HC RX 250 W HCPCS SELF ADMINISTERED DRUGS (ALT 637 FOR MEDICARE OP, ALT 636 FOR OP/ED): Performed by: NURSE PRACTITIONER

## 2024-03-02 PROCEDURE — 2500000005 HC RX 250 GENERAL PHARMACY W/O HCPCS: Performed by: MIDWIFE

## 2024-03-02 PROCEDURE — 36415 COLL VENOUS BLD VENIPUNCTURE: CPT | Performed by: NURSE PRACTITIONER

## 2024-03-02 PROCEDURE — 85027 COMPLETE CBC AUTOMATED: CPT | Performed by: NURSE PRACTITIONER

## 2024-03-02 PROCEDURE — 99231 SBSQ HOSP IP/OBS SF/LOW 25: CPT | Performed by: NURSE PRACTITIONER

## 2024-03-02 RX ORDER — LABETALOL 200 MG/1
400 TABLET, FILM COATED ORAL 2 TIMES DAILY
Qty: 120 TABLET | Refills: 1 | Status: SHIPPED | OUTPATIENT
Start: 2024-03-02 | End: 2024-05-01

## 2024-03-02 RX ORDER — ACETAMINOPHEN 160 MG/5ML
975 LIQUID ORAL EVERY 6 HOURS PRN
Qty: 236 ML | Refills: 1 | Status: SHIPPED | OUTPATIENT
Start: 2024-03-02

## 2024-03-02 RX ORDER — LIDOCAINE HYDROCHLORIDE 10 MG/ML
3 INJECTION, SOLUTION EPIDURAL; INFILTRATION; INTRACAUDAL; PERINEURAL ONCE AS NEEDED
Status: DISCONTINUED | OUTPATIENT
Start: 2024-03-02 | End: 2024-03-02 | Stop reason: HOSPADM

## 2024-03-02 RX ORDER — NIFEDIPINE 60 MG/1
60 TABLET, FILM COATED, EXTENDED RELEASE ORAL
Status: DISCONTINUED | OUTPATIENT
Start: 2024-03-03 | End: 2024-03-02 | Stop reason: HOSPADM

## 2024-03-02 RX ORDER — LABETALOL 200 MG/1
400 TABLET, FILM COATED ORAL EVERY 8 HOURS
Status: DISCONTINUED | OUTPATIENT
Start: 2024-03-02 | End: 2024-03-02 | Stop reason: HOSPADM

## 2024-03-02 RX ORDER — TRIPROLIDINE/PSEUDOEPHEDRINE 2.5MG-60MG
600 TABLET ORAL EVERY 6 HOURS PRN
Qty: 236 ML | Refills: 1 | Status: SHIPPED | OUTPATIENT
Start: 2024-03-02

## 2024-03-02 RX ORDER — NIFEDIPINE 30 MG/1
30 TABLET, FILM COATED, EXTENDED RELEASE ORAL ONCE
Status: COMPLETED | OUTPATIENT
Start: 2024-03-02 | End: 2024-03-02

## 2024-03-02 RX ORDER — IRON,CARBONYL 15 MG
1 TABLET,CHEWABLE ORAL 2 TIMES DAILY
Qty: 60 EACH | Refills: 11 | Status: SHIPPED | OUTPATIENT
Start: 2024-03-02 | End: 2025-03-02

## 2024-03-02 RX ADMIN — ETONOGESTREL 1 EACH: 68 IMPLANT SUBCUTANEOUS at 11:00

## 2024-03-02 RX ADMIN — NIFEDIPINE 30 MG: 30 TABLET, FILM COATED, EXTENDED RELEASE ORAL at 08:13

## 2024-03-02 RX ADMIN — LIDOCAINE HYDROCHLORIDE 300 MG: 10 INJECTION, SOLUTION INFILTRATION; PERINEURAL at 11:00

## 2024-03-02 RX ADMIN — NIFEDIPINE 30 MG: 30 TABLET, FILM COATED, EXTENDED RELEASE ORAL at 06:55

## 2024-03-02 RX ADMIN — LABETALOL HYDROCHLORIDE 400 MG: 200 TABLET, FILM COATED ORAL at 10:07

## 2024-03-02 ASSESSMENT — PAIN SCALES - GENERAL
PAINLEVEL_OUTOF10: 0 - NO PAIN
PAIN_LEVEL: 0

## 2024-03-02 NOTE — DISCHARGE SUMMARY
Discharge Summary    Admission Date: 2024  Discharge Date: 24  Discharge Diagnosis: Preeclampsia, third trimester     Patient Active Problem List   Diagnosis    Anemia    Class 1 obesity    Dysuria    Psychiatric disorder    Condition not found    Depression affecting pregnancy    History of pre-eclampsia    History of pre-term labor    Other problems related to lifestyle    High risk pregnancy case management patient in second trimester    ADHD    Anemia affecting pregnancy in third trimester    High-risk pregnancy in third trimester    GBS (group b Streptococcus) UTI complicating pregnancy, third trimester    Poor fetal growth affecting management of mother in third trimester    Elevated blood pressure affecting pregnancy in third trimester, antepartum    Preeclampsia, third trimester     (normal spontaneous vaginal delivery)       Hospital Course  Maryjo Dykes is a 23 y.o.,     Initially presented for: Elevated BP and HA (resolved after tylenol)    Admission Date: 2024    Delivery Date: 3/1/2024  12:07 AM     Delivery type: Vaginal, Spontaneous      GA at delivery: 37w5d    Outcome: Living     Anesthesia during delivery: Epidural     Intrapartum complications: None     Feeding method: Breastfeeding Status: No    Contraception: Nexplanon inserted today    Rhogam: The patient's blood type is A POS. Rhogam is not indicated.     Now postpartum day: 1.    Hospital course n/f:    PEC w/o SF  - Pt chewed nifedipine, administered doses and resultant BP's are inaccurate.  - HELLP labs neg x2  - BP's persistent mild range on no meds (see above)  - start lab 400 BID, first dose now    I have extensively reviewed with the patient the standard 3 day stay for hypertensive disorders and the risks/benefits of early discharge, including worsening condition, death, stroke, seizure, and readmission. I strongly recommended continued inpatient monitoring, but pt adamantly declines further monitoring at  "this time and requests early discharge today.  She has 4 kids at home, the youngest is 2 y/o. Her grandmother currently has the kids. Pt has had PEC before and is fully aware of the risks of untreated PEC.     Pt has 2 support persons @ bedside.     Meeting all postpartum milestones- ambulating independently, passing flatus, tolerating PO intake, lochia light, voiding spontaneously, and pain well controlled with PO liquid meds.     Dispo  OK for DC today using shared decision making  Joint BP check @ [facility]- message sent to clerical staff OBGYN.  6 week postpartum follow-up with prenatal provider.     Pertinent Physical Exam At Time of Discharge    Pt seen sitting on edge of bed, clutching/resting head in hands, hunched over staring @ floor. Angry and yelling with 1 support person who is telling her to stay. Saying \"Ya'll can't keep me here!\" And \"I'm going home if I say I'm going home.\"   Other support person hold her hand as Nexplanon is inserted.    Calm and cooperative with provider.     General: thin, postpartum, in moderate emotional distress  Obstetric: fundus firm below umbilicus, lochia light  GI: no distension, appropriately tender, soft, +BS  Respiratory: Even and unlabored on RA, LSCTA BL  Cardiovascular: No BLE edema; No erythema, warmth  Psych: emotionally labile       Your medication list        START taking these medications        Instructions Last Dose Given Next Dose Due   acetaminophen 160 mg/5 mL liquid  Commonly known as: Tylenol      Take 30.5 mL (975 mg) by mouth every 6 hours if needed for mild pain (1 - 3) (pain).       ibuprofen 100 mg/5 mL suspension      Take 30 mL (600 mg) by mouth every 6 hours if needed (pain).       labetalol 200 mg tablet  Commonly known as: Normodyne      Take 2 tablets (400 mg) by mouth 2 times a day.              CONTINUE taking these medications        Instructions Last Dose Given Next Dose Due   Iron Chews 15 mg tablet,chewable  Generic drug: iron, carbonyl   "    Chew 1 tablet 2 times a day.       PRENATAL ORAL                  STOP taking these medications      aspirin 81 mg EC tablet        metoclopramide 10 mg tablet  Commonly known as: Reglan        ondansetron 4 mg tablet  Commonly known as: Zofran        ondansetron ODT 4 mg disintegrating tablet  Commonly known as: Zofran-ODT                  Where to Get Your Medications        These medications were sent to [facility]/pharmacy #[id number] - [place] - [address] AT [facility]  [address], [place] [id number]      Phone: [phone]   acetaminophen 160 mg/5 mL liquid  ibuprofen 100 mg/5 mL suspension  Iron Chews 15 mg tablet,chewable  labetalol 200 mg tablet           Outpatient Follow-Up  Future Appointments   Date Time Provider Department Center   3/6/2024 10:00 AM [facility] [id number]YN[facility]G ULTRASOUND 1 [id number] [facility] JUDAH Perdomo-CNP

## 2024-03-02 NOTE — PROCEDURES
General    Date/Time: 3/2/2024 1:03 PM    Performed by: QUIN Medeiros  Authorized by: QUIN Medeiros    Consent:     Consent obtained:  Verbal and written    Consent given by:  Patient    Risks, benefits, and alternatives were discussed: yes      Risks discussed:  Bleeding, infection and pain    Alternatives discussed:  No treatment, delayed treatment and alternative treatment  Universal protocol:     Procedure explained and questions answered to patient or proxy's satisfaction: yes      Relevant documents present and verified: yes      Required blood products, implants, devices, and special equipment available: yes      Site/side marked: yes      Immediately prior to procedure, a time out was called: yes      Patient identity confirmed:  Verbally with patient  Indications:     Indications:  Request for subdermal LARC  Pre-procedure details:     Skin preparation:  Povidone-iodine  Sedation:     Sedation type:  None  Anesthesia:     Anesthesia method:  Local infiltration    Local anesthetic:  Lidocaine 1% w/o epi  Procedure specific details:      Nexplanon placement      Risks, benefits and alternatives were discussed with the patient. We discussed possible complications and risks, including infection, bleeding, pain, tingling, failure, migration of implant, increased risk of ectopic should pregnancy occur and inability to remove implant. Electronic consent was obtained prior to the procedure and is detailed in the patient's record.     Procedure Note: Patient placed in semi-fowlers position. 3 ml of 1% lidocaine was injected just under the skin at marked insertion site.  The skin was then prepped with betadine.   Using standard technique, the implant was inserted in the inner side of the patient's non-dominant (RIGHT) upper arm. Insertion was confirmed by visual inspection of the tip of the needle and palpation of the patient's arm. No blood loss.  Post insertion precautions/expectations were  discussed with the patient.  QUIN Medeiros     Post-procedure details:     Procedure completion:  Tolerated well, no immediate complications

## 2024-03-02 NOTE — ANESTHESIA POSTPROCEDURE EVALUATION
Patient: Maryjo Dykes    Procedure Summary       Date: 24 Room / Location:     Anesthesia Start:  Anesthesia Stop: 24    Procedure: Labor Analgesia Diagnosis:     Scheduled Providers:  Responsible Provider: Boogie Moctezuma MD    Anesthesia Type: epidural ASA Status: 3          Maryjo Dykes is a 23 y.o., , who had a Vaginal, Spontaneous  delivery on 3/1/2024  at 37w5d and is now POD1.    She had Neuraxial Anesthesia without immediate complications noted.       Pain well controlled    Vitals:    24 0811   BP: (!) 154/101   Pulse:    Resp:    Temp:    SpO2:        Neuraxial site assessed. No visible redness or swelling or drainage. Patient able to ambulate and move all extremities without difficulty. Able to void. No complaints of nausea/vomiting. Tolerating PO intake well. No s/sx of PDPH.     Anesthesia will sign off     Michael Coyne MD        Anesthesia Post Evaluation    Patient location during evaluation: floor  Patient participation: complete - patient participated  Level of consciousness: awake and alert  Pain score: 0  Pain management: adequate  Airway patency: patent  Cardiovascular status: stable  Respiratory status: spontaneous ventilation  Hydration status: acceptable  Postoperative Nausea and Vomiting: none        No notable events documented.

## 2024-03-03 LAB
BLOOD EXPIRATION DATE: NORMAL
DISPENSE STATUS: NORMAL
PRODUCT BLOOD TYPE: 6200
PRODUCT CODE: NORMAL
UNIT ABO: NORMAL
UNIT NUMBER: NORMAL
UNIT RH: NORMAL
UNIT VOLUME: 350
XM INTEP: NORMAL

## 2024-03-05 LAB
LABORATORY COMMENT REPORT: NORMAL
PATH REPORT.FINAL DX SPEC: NORMAL
PATH REPORT.GROSS SPEC: NORMAL
PATH REPORT.RELEVANT HX SPEC: NORMAL
PATH REPORT.TOTAL CANCER: NORMAL

## 2024-03-21 NOTE — L&D DELIVERY NOTE
OB Delivery Note  3/1/2024  Maryjo Dykes  23 y.o.   Vaginal, Spontaneous        Gestational Age: 37w5d  /Para:   Quantitative Blood Loss: Admission to Discharge: 150 mL (2024  3:48 PM - 3/2/2024  1:53 PM)    Andre Kim [[id number]]      Labor Events    Sac identifier: Sac 1  Rupture date/time: 2024 2240  Rupture type: Artificial  Fluid color: Clear  Fluid odor: None  Labor type: Induced Onset of Labor  Labor allowed to proceed with plans for an attempted vaginal birth?: Yes  Induction: Oxytocin  Induction date/time: 2024  Complications: None       Labor Event Times    Dilation complete date/time: 3/1/2024 0002  Start pushing date/time: 3/1/2024 0005       Labor Length    2nd stage: 0h 05m  3rd stage: 0h 04m       Placenta    Placenta delivery date/time: 3/1/2024 0011  Placenta removal: Expressed  Placenta appearance: Intact  Placenta disposition: pathology       Cord    Vessels: 3 vessels  Complications: None  Delayed cord clamping?: Yes  Cord blood disposition: Discarded  Gases sent?: No  Stem cell collection (by provider): No       Lacerations    Episiotomy: None       Anesthesia    Method: Epidural       Operative Delivery    Forceps attempted?: No  Vacuum extractor attempted?: No       Shoulder Dystocia    Shoulder dystocia present?: No       [place] Delivery    Time head delivered: 3/1/2024 00:07:00  Birth date/time: 3/1/2022 00:07:00  Delivery type: Vaginal, Spontaneous  Complications: None       Resuscitation    Method: None       Apgars    Living status: Living  Apgar Component Scores:  1 min.:  5 min.:  10 min.:  15 min.:  20 min.:    Skin color:  1  1       Heart rate:  2  2       Reflex irritability:  2  2       Muscle tone:  2  2       Respiratory effort:  2  2       Total:  9  9              Delivery Providers    Delivering clinician: JUDAH Dye-HARINI   Provider Role    Debi Sanders RN Delivery Nurse    Regan Vang RN Nursery Nurse     Resident                  JUDAH Dye-RENYM

## 2024-06-12 ENCOUNTER — HOSPITAL ENCOUNTER (EMERGENCY)
Facility: HOSPITAL | Age: 24
Discharge: HOME | End: 2024-06-12
Payer: MEDICAID

## 2024-06-12 VITALS
HEART RATE: 59 BPM | TEMPERATURE: 97.7 F | WEIGHT: 142 LBS | OXYGEN SATURATION: 99 % | DIASTOLIC BLOOD PRESSURE: 98 MMHG | BODY MASS INDEX: 28.63 KG/M2 | HEIGHT: 59 IN | RESPIRATION RATE: 18 BRPM | SYSTOLIC BLOOD PRESSURE: 154 MMHG

## 2024-06-12 DIAGNOSIS — A08.4 VIRAL GASTROENTERITIS: Primary | ICD-10-CM

## 2024-06-12 LAB
ALBUMIN SERPL BCP-MCNC: 4.9 G/DL (ref 3.4–5)
ALP SERPL-CCNC: 70 U/L (ref 33–110)
ALT SERPL W P-5'-P-CCNC: 14 U/L (ref 7–45)
ANION GAP SERPL CALC-SCNC: 17 MMOL/L (ref 10–20)
AST SERPL W P-5'-P-CCNC: 35 U/L (ref 9–39)
BASOPHILS # BLD AUTO: 0.03 X10*3/UL (ref 0–0.1)
BASOPHILS NFR BLD AUTO: 0.3 %
BILIRUB SERPL-MCNC: 0.4 MG/DL (ref 0–1.2)
BUN SERPL-MCNC: 7 MG/DL (ref 6–23)
CALCIUM SERPL-MCNC: 9.7 MG/DL (ref 8.6–10.6)
CHLORIDE SERPL-SCNC: 105 MMOL/L (ref 98–107)
CO2 SERPL-SCNC: 19 MMOL/L (ref 21–32)
CREAT SERPL-MCNC: 0.61 MG/DL (ref 0.5–1.05)
EGFRCR SERPLBLD CKD-EPI 2021: >90 ML/MIN/1.73M*2
EOSINOPHIL # BLD AUTO: 0 X10*3/UL (ref 0–0.7)
EOSINOPHIL NFR BLD AUTO: 0 %
ERYTHROCYTE [DISTWIDTH] IN BLOOD BY AUTOMATED COUNT: 14.4 % (ref 11.5–14.5)
GLUCOSE SERPL-MCNC: 117 MG/DL (ref 74–99)
HCT VFR BLD AUTO: 35.4 % (ref 36–46)
HGB BLD-MCNC: 12.6 G/DL (ref 12–16)
IMM GRANULOCYTES # BLD AUTO: 0.03 X10*3/UL (ref 0–0.7)
IMM GRANULOCYTES NFR BLD AUTO: 0.3 % (ref 0–0.9)
LIPASE SERPL-CCNC: 12 U/L (ref 9–82)
LYMPHOCYTES # BLD AUTO: 1.57 X10*3/UL (ref 1.2–4.8)
LYMPHOCYTES NFR BLD AUTO: 15 %
MAGNESIUM SERPL-MCNC: 1.89 MG/DL (ref 1.6–2.4)
MCH RBC QN AUTO: 30.3 PG (ref 26–34)
MCHC RBC AUTO-ENTMCNC: 35.6 G/DL (ref 32–36)
MCV RBC AUTO: 85 FL (ref 80–100)
MONOCYTES # BLD AUTO: 0.15 X10*3/UL (ref 0.1–1)
MONOCYTES NFR BLD AUTO: 1.4 %
NEUTROPHILS # BLD AUTO: 8.66 X10*3/UL (ref 1.2–7.7)
NEUTROPHILS NFR BLD AUTO: 83 %
NRBC BLD-RTO: 0 /100 WBCS (ref 0–0)
PLATELET # BLD AUTO: 340 X10*3/UL (ref 150–450)
POTASSIUM SERPL-SCNC: 4.9 MMOL/L (ref 3.5–5.3)
PROT SERPL-MCNC: 8.2 G/DL (ref 6.4–8.2)
RBC # BLD AUTO: 4.16 X10*6/UL (ref 4–5.2)
SODIUM SERPL-SCNC: 136 MMOL/L (ref 136–145)
WBC # BLD AUTO: 10.4 X10*3/UL (ref 4.4–11.3)

## 2024-06-12 PROCEDURE — 83735 ASSAY OF MAGNESIUM: CPT | Performed by: PHYSICIAN ASSISTANT

## 2024-06-12 PROCEDURE — 96374 THER/PROPH/DIAG INJ IV PUSH: CPT | Mod: 59

## 2024-06-12 PROCEDURE — 96361 HYDRATE IV INFUSION ADD-ON: CPT

## 2024-06-12 PROCEDURE — 2500000004 HC RX 250 GENERAL PHARMACY W/ HCPCS (ALT 636 FOR OP/ED): Mod: SE | Performed by: PHYSICIAN ASSISTANT

## 2024-06-12 PROCEDURE — 99284 EMERGENCY DEPT VISIT MOD MDM: CPT | Performed by: PHYSICIAN ASSISTANT

## 2024-06-12 PROCEDURE — 83690 ASSAY OF LIPASE: CPT | Performed by: PHYSICIAN ASSISTANT

## 2024-06-12 PROCEDURE — 85025 COMPLETE CBC W/AUTO DIFF WBC: CPT | Performed by: PHYSICIAN ASSISTANT

## 2024-06-12 PROCEDURE — 84075 ASSAY ALKALINE PHOSPHATASE: CPT | Performed by: PHYSICIAN ASSISTANT

## 2024-06-12 PROCEDURE — 96372 THER/PROPH/DIAG INJ SC/IM: CPT | Performed by: PHYSICIAN ASSISTANT

## 2024-06-12 PROCEDURE — 99284 EMERGENCY DEPT VISIT MOD MDM: CPT | Mod: 25

## 2024-06-12 PROCEDURE — 36415 COLL VENOUS BLD VENIPUNCTURE: CPT | Performed by: PHYSICIAN ASSISTANT

## 2024-06-12 RX ORDER — ONDANSETRON 4 MG/1
4 TABLET, ORALLY DISINTEGRATING ORAL 3 TIMES DAILY PRN
Qty: 21 TABLET | Refills: 0 | Status: SHIPPED | OUTPATIENT
Start: 2024-06-12 | End: 2024-06-19

## 2024-06-12 RX ORDER — DICYCLOMINE HYDROCHLORIDE 10 MG/ML
20 INJECTION INTRAMUSCULAR ONCE
Status: COMPLETED | OUTPATIENT
Start: 2024-06-12 | End: 2024-06-12

## 2024-06-12 RX ORDER — ONDANSETRON HYDROCHLORIDE 2 MG/ML
4 INJECTION, SOLUTION INTRAVENOUS ONCE
Status: COMPLETED | OUTPATIENT
Start: 2024-06-12 | End: 2024-06-12

## 2024-06-12 ASSESSMENT — PAIN SCALES - GENERAL: PAINLEVEL_OUTOF10: 8

## 2024-06-12 ASSESSMENT — PAIN - FUNCTIONAL ASSESSMENT: PAIN_FUNCTIONAL_ASSESSMENT: 0-10

## 2024-06-12 ASSESSMENT — COLUMBIA-SUICIDE SEVERITY RATING SCALE - C-SSRS
6. HAVE YOU EVER DONE ANYTHING, STARTED TO DO ANYTHING, OR PREPARED TO DO ANYTHING TO END YOUR LIFE?: NO
1. IN THE PAST MONTH, HAVE YOU WISHED YOU WERE DEAD OR WISHED YOU COULD GO TO SLEEP AND NOT WAKE UP?: NO
2. HAVE YOU ACTUALLY HAD ANY THOUGHTS OF KILLING YOURSELF?: NO

## 2024-06-12 NOTE — ED PROVIDER NOTES
HPI   Chief Complaint   Patient presents with    Extremity Weakness    Vomiting       HPI: Patient is a 24-year-old otherwise healthy female who presents to the ED for weakness.  Patient states that yesterday night she began having nausea, vomiting and diarrhea.  States that she has been throwing up so much that she has begun to feel generally weak.  She notes some associated pain in her lower abdomen but denies any dysuria, hematuria, abnormal vaginal discharge, concern for STDs, concern for pregnancy.  States that there has been a GI virus that has been going around in her family.   -------------------------------------------------------------------------------------------------------------------------------  ROS: a ten point review of systems was performed and was negative except as per HPI.  ------------------------------------------------------------------------------------------------------------------------------------------  PMH / PSH: as per HPI, otherwise reviewed   MEDS: as per HPI, otherwise reviewed in EMR  ALLERGIES: as per HPI, otherwise reviewed in EMR  SocH:  as per HPI, otherwise reviewed in EMR  FH:  as per HPI, otherwise reviewed in EMR   ------------------------------------------------------------------------------------------------------------------------------------------  Physical Exam:  VS: As documented in the triage note and EMR flowsheet from this visit was reviewed  General: Well appearing. No acute distress.   Eyes:  Extraocular movements grossly intact. No scleral icterus.   Head: Atraumatic. Normocephalic.     Neck: No meningismus. No gross masses. Full movement through range of motion  ENT: Posterior oropharynx shows no erythema, exudate or edema.  Uvula is midline without edema.  No stridor or trismus  CV: Regular rhythm. No murmurs, rubs, gallops appreciated.   Resp: Clear to auscultation bilaterally. No respiratory distress.    GI: Nontender. Soft. No masses. No rebound, rigidity  or guarding.   MSK: Symmetric muscle bulk. No gross step offs or deformities.  Skin: Warm, dry. No rashes  Neuro: CN II-VII intact. A&O x3. Speech fluent. Alert. Moving all extremities. Ambulates with normal gait  Psych: Appropriate mood and affect for situation  ------------------------------------------------------------------------------------------------------------------------------------------  Hospital Course / Medical Decision Making: Patient is a 24-year-old female who presents to the ED for abdominal pain, nausea, vomiting and diarrhea.  On examination, patient is well-appearing.  Vitals are stable.  Abdominal examination is benign. Patient administered IV fluids, Bentyl, Zofran.  CBC showed no evidence of leukocytosis or anemia.  CMP shows no renal, electrolyte or hepatic abnormality.  Lipase within normal range.  Magnesium within normal range.  On reassessment, patient feeling much more improved.  She has tolerated crackers and ginger ale without further emesis.  I did want to obtain a urine sample to rule out pregnancy or infection however patient states that she is unable to give this at this time and is ready to be discharged.  Patient was written a prescription for Zofran.  Advised on oral hydration at home. Patient has remained hemodynamically stable throughout the course of their ED stay.  Patient is home-going.  Patient advised to return to the ED for any worsening symptoms.  Advised to follow-up with PCP.  Patient was discharged in stable condition.                            [place] Coma Scale Score: 15                     Patient History   Past Medical History:   Diagnosis Date    Depression     History of pre-eclampsia     History of  delivery, currently pregnant ([facility])     Hyperemesis affecting pregnancy, antepartum ([facility])     Pregnancy ([facility])     Psychiatric problem     ADHD, BIPOLAR, SCHIZOPHRENIA    UTI in pregnancy, antepartum ([facility])      No past surgical history on  file.  No family history on file.  Social History     Tobacco Use    Smoking status: Never    Smokeless tobacco: Never   Vaping Use    Vaping status: Unknown   Substance Use Topics    Alcohol use: Never    Drug use: Never       Physical Exam   ED Triage Vitals [06/12/24 1850]   Temperature Heart Rate Respirations BP   36.5 °C (97.7 °F) 58 18 129/75      Pulse Ox Temp src Heart Rate Source Patient Position   100 % -- -- --      BP Location FiO2 (%)     -- --       Physical Exam    ED Course & MDM   Diagnoses as of 06/12/24 2231   Viral gastroenteritis       Medical Decision Making      Procedure  Procedures     Leslie Hernandez PA-C  06/12/24 2233

## 2024-06-12 NOTE — ED TRIAGE NOTES
Presents with c/o vomiting since last night. States she fell while trying to get out of the bed and is now having BUE/BLE pain/ weakness